# Patient Record
Sex: MALE | Race: WHITE | URBAN - METROPOLITAN AREA
[De-identification: names, ages, dates, MRNs, and addresses within clinical notes are randomized per-mention and may not be internally consistent; named-entity substitution may affect disease eponyms.]

---

## 2017-01-06 RX ORDER — PRAVASTATIN SODIUM 20 MG
TABLET ORAL
Qty: 30 TABLET | Refills: 5 | Status: SHIPPED | OUTPATIENT
Start: 2017-01-06 | End: 2017-07-11 | Stop reason: SDUPTHER

## 2017-06-13 ENCOUNTER — OFFICE VISIT (OUTPATIENT)
Dept: CARDIOLOGY CLINIC | Age: 82
End: 2017-06-13

## 2017-06-13 VITALS
SYSTOLIC BLOOD PRESSURE: 122 MMHG | DIASTOLIC BLOOD PRESSURE: 62 MMHG | HEIGHT: 69 IN | WEIGHT: 155 LBS | HEART RATE: 64 BPM | OXYGEN SATURATION: 98 % | BODY MASS INDEX: 22.96 KG/M2

## 2017-06-13 DIAGNOSIS — Z00.00 GENERAL MEDICAL EXAM: ICD-10-CM

## 2017-06-13 DIAGNOSIS — I65.23 BILATERAL CAROTID ARTERY STENOSIS: ICD-10-CM

## 2017-06-13 DIAGNOSIS — E78.2 MIXED HYPERLIPIDEMIA: ICD-10-CM

## 2017-06-13 DIAGNOSIS — I25.10 CORONARY ARTERY DISEASE INVOLVING NATIVE HEART WITHOUT ANGINA PECTORIS, UNSPECIFIED VESSEL OR LESION TYPE: Primary | ICD-10-CM

## 2017-06-13 DIAGNOSIS — I10 ESSENTIAL HYPERTENSION: ICD-10-CM

## 2017-06-13 PROCEDURE — 99214 OFFICE O/P EST MOD 30 MIN: CPT | Performed by: NURSE PRACTITIONER

## 2017-06-15 ENCOUNTER — TELEPHONE (OUTPATIENT)
Dept: CARDIOLOGY CLINIC | Age: 82
End: 2017-06-15

## 2017-06-15 ENCOUNTER — HOSPITAL ENCOUNTER (OUTPATIENT)
Dept: VASCULAR LAB | Age: 82
Discharge: HOME | End: 2017-06-15
Attending: NURSE PRACTITIONER | Admitting: NURSE PRACTITIONER

## 2017-06-15 DIAGNOSIS — I65.23 OCCLUSION AND STENOSIS OF BILATERAL CAROTID ARTERIES: ICD-10-CM

## 2017-06-15 DIAGNOSIS — I65.23 BILATERAL CAROTID ARTERY STENOSIS: ICD-10-CM

## 2017-07-11 RX ORDER — PRAVASTATIN SODIUM 20 MG
TABLET ORAL
Qty: 30 TABLET | Refills: 4 | Status: SHIPPED | OUTPATIENT
Start: 2017-07-11 | End: 2017-08-04 | Stop reason: SDUPTHER

## 2017-08-04 RX ORDER — PRAVASTATIN SODIUM 20 MG
TABLET ORAL
Qty: 90 TABLET | Refills: 1 | Status: SHIPPED | OUTPATIENT
Start: 2017-08-04 | End: 2018-06-03

## 2018-01-02 ENCOUNTER — OFFICE VISIT (OUTPATIENT)
Dept: CARDIOLOGY CLINIC | Age: 83
End: 2018-01-02

## 2018-01-02 ENCOUNTER — HOSPITAL ENCOUNTER (OUTPATIENT)
Dept: OTHER | Age: 83
Discharge: HOME | End: 2018-01-02
Attending: NURSE PRACTITIONER | Admitting: NURSE PRACTITIONER

## 2018-01-02 VITALS
HEART RATE: 52 BPM | BODY MASS INDEX: 23.4 KG/M2 | SYSTOLIC BLOOD PRESSURE: 120 MMHG | DIASTOLIC BLOOD PRESSURE: 60 MMHG | HEIGHT: 69 IN | WEIGHT: 158 LBS

## 2018-01-02 DIAGNOSIS — E78.2 MIXED HYPERLIPIDEMIA: Primary | ICD-10-CM

## 2018-01-02 DIAGNOSIS — I25.10 CORONARY ARTERY DISEASE INVOLVING NATIVE HEART WITHOUT ANGINA PECTORIS, UNSPECIFIED VESSEL OR LESION TYPE: ICD-10-CM

## 2018-01-02 DIAGNOSIS — I10 ESSENTIAL HYPERTENSION: ICD-10-CM

## 2018-01-02 DIAGNOSIS — I65.23 BILATERAL CAROTID ARTERY STENOSIS: ICD-10-CM

## 2018-01-02 DIAGNOSIS — E78.2 MIXED HYPERLIPIDEMIA: ICD-10-CM

## 2018-01-02 LAB
A/G RATIO: 1.3 (ref 1.1–2.2)
ALBUMIN SERPL-MCNC: 4.2 G/DL (ref 3.4–5)
ALP BLD-CCNC: 73 U/L (ref 40–129)
ALT SERPL-CCNC: 16 U/L (ref 10–40)
ANION GAP SERPL CALCULATED.3IONS-SCNC: 9 MMOL/L (ref 3–16)
AST SERPL-CCNC: 22 U/L (ref 15–37)
BILIRUB SERPL-MCNC: 0.6 MG/DL (ref 0–1)
BUN BLDV-MCNC: 17 MG/DL (ref 7–20)
CALCIUM SERPL-MCNC: 9.5 MG/DL (ref 8.3–10.6)
CHLORIDE BLD-SCNC: 100 MMOL/L (ref 99–110)
CHOLESTEROL, TOTAL: 126 MG/DL (ref 0–199)
CO2: 28 MMOL/L (ref 21–32)
CREAT SERPL-MCNC: 0.8 MG/DL (ref 0.8–1.3)
GFR AFRICAN AMERICAN: >60
GFR NON-AFRICAN AMERICAN: >60
GLOBULIN: 3.3 G/DL
GLUCOSE BLD-MCNC: 103 MG/DL (ref 70–99)
HDLC SERPL-MCNC: 36 MG/DL (ref 40–60)
LDL CHOLESTEROL CALCULATED: 69 MG/DL
POTASSIUM SERPL-SCNC: 4.8 MMOL/L (ref 3.5–5.1)
SODIUM BLD-SCNC: 137 MMOL/L (ref 136–145)
TOTAL PROTEIN: 7.5 G/DL (ref 6.4–8.2)
TRIGL SERPL-MCNC: 106 MG/DL (ref 0–150)
VLDLC SERPL CALC-MCNC: 21 MG/DL

## 2018-01-02 PROCEDURE — 99214 OFFICE O/P EST MOD 30 MIN: CPT | Performed by: NURSE PRACTITIONER

## 2018-01-02 NOTE — LETTER
[address] [facility]  [address]  [address] [address]  Phone: [phone]  Fax: [phone]    Alex Tyler NP        January 2, 2018     Amanda Noland CHAPIN  [address] [id number]    Patient: Ely Osorio  MR Number: [id number]  YOB: 1935  Date of Visit: 1/2/2018    Dear Dr. Steiner Given:      [facility]     Outpatient Follow Up Note    Ely Osorio is [de-identified] y.o. male who presents today with a history of CAD s/p CABG June '12, HTN, hyperlipidemia and tanvi carotid stenosis at 16-49% bilaterally by US Jun '17. CHIEF COMPLAINT / HPI:  Follow Up secondary to CAD    Subjective:   He denies significant chest pain. There is no SOB/MALIK. The patient denies orthopnea/PND. The patient does not have swelling. The patients weight is fairly stable at 160lbs. The patient is not experiencing palpitations. Once in a while he'll feel slightly dizzy. He denies swelling. These symptoms are stable since the last OV. With regard to medication therapy the patient has been compliant with prescribed regimen. They have tolerated therapy to date. Current Outpatient Prescriptions   Medication Sig Dispense Refill    pravastatin (PRAVACHOL) 20 MG tablet TAKE ONE TABLET BY MOUTH DAILY 90 tablet 1    metoprolol tartrate (LOPRESSOR) 25 MG tablet TAKE ONE-HALF TABLET BY MOUTH TWICE A DAY (Patient taking differently: once a day) 30 tablet 5    aspirin 81 MG EC tablet Take 1 tablet by mouth daily. 30 tablet 6    Vitamin D (CHOLECALCIFEROL) 1000 UNITS CAPS capsule Take 1,000 Units by mouth daily. REVIEW OF SYSTEMS:    RESPIRATORY: No new SOB, PND, orthopnea; + np cough. NEUROLOGICAL: + dizziness April '16; deniesTIA-like symptoms.     Objective:   PHYSICAL EXAM:    Vitals:    01/02/18 1036 01/02/18 1056   BP: 132/60 120/60   Site: Left Arm Left Arm   Position: Sitting    Cuff Size: Medium Adult Medium Adult   Pulse: 52

## 2018-01-02 NOTE — PROGRESS NOTES
[address]     Outpatient Follow Up Note    Josh Casitllo is [de-identified] y.o. male who presents today with a history of CAD s/p CABG June '12, HTN, hyperlipidemia and tanvi carotid stenosis at 16-49% bilaterally by US Jun '17. CHIEF COMPLAINT / HPI:  Follow Up secondary to CAD    Subjective:   He denies significant chest pain. There is no SOB/MALIK. The patient denies orthopnea/PND. The patient does not have swelling. The patients weight is fairly stable at 160lbs. The patient is not experiencing palpitations. Once in a while he'll feel slightly dizzy. He denies swelling. These symptoms are stable since the last OV. With regard to medication therapy the patient has been compliant with prescribed regimen. They have tolerated therapy to date. Past Medical History:   Diagnosis Date    Arthritis     CAD (coronary artery disease)     GERD (gastroesophageal reflux disease)     Hyperlipidemia     MI (myocardial infarction)     Myocardial infarct     40 years ago     Social History:    History   Smoking Status    Never Smoker   Smokeless Tobacco    Never Used     Current Medications:  Current Outpatient Prescriptions   Medication Sig Dispense Refill    pravastatin (PRAVACHOL) 20 MG tablet TAKE ONE TABLET BY MOUTH DAILY 90 tablet 1    metoprolol tartrate (LOPRESSOR) 25 MG tablet TAKE ONE-HALF TABLET BY MOUTH TWICE A DAY (Patient taking differently: once a day) 30 tablet 5    aspirin 81 MG EC tablet Take 1 tablet by mouth daily. 30 tablet 6    Vitamin D (CHOLECALCIFEROL) 1000 UNITS CAPS capsule Take 1,000 Units by mouth daily. No current facility-administered medications for this visit. REVIEW OF SYSTEMS:    CONSTITUTIONAL: No major weight gain or loss, fatigue, weakness, night sweats or fever. HEENT: No new vision difficulties or ringing in the ears. RESPIRATORY: No new SOB, PND, orthopnea; + np cough.    CARDIOVASCULAR: See HPI  GI: No nausea, vomiting, diarrhea, constipation, abdominal

## 2018-01-03 ENCOUNTER — TELEPHONE (OUTPATIENT)
Dept: CARDIOLOGY CLINIC | Age: 83
End: 2018-01-03

## 2018-01-03 NOTE — TELEPHONE ENCOUNTER
Spoke with patient and relayed information:  Notes Recorded by Jeffrey Coronado NP on 1/2/2018 at 4:35 PM EST  Looks good

## 2018-06-03 RX ORDER — PRAVASTATIN SODIUM 20 MG
TABLET ORAL
Qty: 30 TABLET | Refills: 5 | Status: SHIPPED | OUTPATIENT
Start: 2018-06-03 | End: 2018-10-12 | Stop reason: SDUPTHER

## 2018-07-11 ENCOUNTER — OFFICE VISIT (OUTPATIENT)
Dept: CARDIOLOGY CLINIC | Age: 83
End: 2018-07-11

## 2018-07-11 VITALS
WEIGHT: 171 LBS | BODY MASS INDEX: 25.33 KG/M2 | DIASTOLIC BLOOD PRESSURE: 62 MMHG | HEIGHT: 69 IN | SYSTOLIC BLOOD PRESSURE: 130 MMHG | HEART RATE: 55 BPM

## 2018-07-11 DIAGNOSIS — I25.10 CORONARY ARTERY DISEASE INVOLVING NATIVE HEART WITHOUT ANGINA PECTORIS, UNSPECIFIED VESSEL OR LESION TYPE: Primary | ICD-10-CM

## 2018-07-11 DIAGNOSIS — I65.23 BILATERAL CAROTID ARTERY STENOSIS: ICD-10-CM

## 2018-07-11 DIAGNOSIS — E78.2 MIXED HYPERLIPIDEMIA: ICD-10-CM

## 2018-07-11 DIAGNOSIS — I10 ESSENTIAL HYPERTENSION: ICD-10-CM

## 2018-07-11 PROCEDURE — 99214 OFFICE O/P EST MOD 30 MIN: CPT | Performed by: NURSE PRACTITIONER

## 2018-07-11 PROCEDURE — 93000 ELECTROCARDIOGRAM COMPLETE: CPT | Performed by: NURSE PRACTITIONER

## 2018-07-11 NOTE — PROGRESS NOTES
106 01/02/2018    TRIG 83 12/12/2016    TRIG 93 12/09/2015     Lab Results   Component Value Date    HDL 36 (L) 01/02/2018    HDL 46 12/12/2016    HDL 41 12/09/2015     Lab Results   Component Value Date    LDLCALC 69 01/02/2018    LDLCALC 51 12/12/2016    LDLCALC 60 12/09/2015     Lab Results   Component Value Date    LABVLDL 21 01/02/2018    LABVLDL 17 12/12/2016    LABVLDL 19 12/09/2015     Radiology Review:  Pertinent images / reports were reviewed as a part of this visit and reveals the following:    Carotid US: Jun '17:  Summary        -The right internal carotid artery appears to have a 16-49% diameter    reducing stenosis based on velocity criteria.    -The left internal carotid artery appears to have a 16-49% diameter reducing    stenosis based on velocity criteria. Last Stress Test: 5/5/2016  Normal exam  NSR throughout    Last Echo: 4/25/2016  -Normal left ventricle size, wall thickness and systolic function with an estimated ejection fraction of 60%.  -There is abnormal septal motion.   -There is reversal of E/A inflow velocities across the mitral valve   suggesting impaired left ventricular relaxation.   -There is trivial tricuspid regurgitation with RVSP estimated at 34 mmHg. Assessment:     1. Coronary artery disease involving native heart without angina pectoris, unspecified vessel or lesion type   ~stable : denies angina/CP  ~s/p CABG June '12  ~ASA / statin  ~exercises routinely     2. Essential hypertension   ~controlled    3. Carotid Stenosis  ~stable: tanvi ICA 16-49% by US in Jun '17 and comparable to US from '12  ~ ASA / Statin  ~denies stroke-like symptoms     3. Mixed hyperlipidemia   ~HDL near goal otherwise controlled on pravastatin          I had the opportunity to review the clinical symptoms and presentation of Magy Warner. Plan:     1. EKG: sinus rhythm, RBBB (comparable to EKG '16)  2.  Follow-up in 9 months   ~plan to repeat carotid US '23    Overall the patient is stable from CV standpoint    I have addresed the patient's cardiac risk factors and adjusted pharmacologic treatment as needed. In addition, I have reinforced the need for patient directed risk factor modification. Further evaluation will be based upon the patient's clinical course and testing results. All questions and concerns were addressed to the patient. Alternatives to my treatment were discussed. The patient is not currently smoking. The risks related to smoking were reviewed with the patient. Recommend maintaining a smoke-free lifestyle. Patient is on a beta-blocker  Patient is not on an ace-i/ARB: CHF negative. Stopped in March '13 d/t low BP  Patient is on a statin     Antiplatelet therapy has been recommended / prescribed for this patient. Education conducted on adverse reactions including bleeding was discussed. The patient verbalizes understanding not to stop medications without discussing with us. Discussed exercise: 30-60 minutes 7 days/week; 3 x / week : swim 4 lengths of pool, treadmill , bicycle, weight : one hour work out  Discussed Low saturated fat diet. Thank you for allowing to us to participate in the care of Yadira Weiner.     Ann Ramires CNP    Documentation of today's visit sent to PCP

## 2018-07-11 NOTE — LETTER
CONSTITUTIONAL: Cooperative, no apparent distress, and appears well nourished / developed  NEUROLOGIC:  Awake and orientated to person, place and time. PSYCH: Calm affect. SKIN: Warm and dry. HEENT: Sclera non-icteric, normocephalic, neck supple, no elevation of JVP, normal carotid pulses with no bruits and thyroid normal size. LUNGS:  No increased work of breathing and clear to auscultation, no crackles or wheezing  CARDIOVASCULAR:  Regular rate 52 and rhythm with no murmurs, gallops, rubs, or abnormal heart sounds, normal PMI. The apical impulses not displaced  JVP less than 8 cm H2O  Heart tones are crisp and normal  Cervical veins are not engorged  The carotid upstroke is normal in amplitude and contour without delay or bruit  JVP is not elevated  ABDOMEN:  Normal bowel sounds, non-distended and non-tender to palpation  EXT: No edema, no calf tenderness. Pulses are present bilaterally. DATA:    Lab Results   Component Value Date    ALT 16 01/02/2018    AST 22 01/02/2018    ALKPHOS 73 01/02/2018    BILITOT 0.6 01/02/2018     Lab Results   Component Value Date    CREATININE 0.8 01/02/2018    BUN 17 01/02/2018     01/02/2018    K 4.8 01/02/2018     01/02/2018    CO2 28 01/02/2018     Lab Results   Component Value Date    TRIG 106 01/02/2018     Lab Results   Component Value Date    HDL 36 (L) 01/02/2018     Lab Results   Component Value Date    LDLCALC 69 01/02/2018     Lab Results   Component Value Date    LABVLDL 21 01/02/2018       Assessment:     1. Coronary artery disease involving native heart without angina pectoris, unspecified vessel or lesion type   ~stable : denies angina/CP  ~s/p CABG June '12  ~ASA / statin  ~exercises routinely     2. Essential hypertension   ~controlled    3. Carotid Stenosis  ~stable: tanvi ICA 16-49% by US in Jun '17 and comparable to US from '12  ~ ASA / Statin  ~denies stroke-like symptoms     3.  Mixed hyperlipidemia ~HDL near goal otherwise controlled on pravastatin          I had the opportunity to review the clinical symptoms and presentation of Nacho Simmons. Plan:     1. EKG: sinus rhythm, RBBB (comparable to EKG '16)  2. Follow-up in 9 months   ~plan to repeat carotid US '19    Overall the patient is stable from CV standpoint    Thank you for allowing to us to participate in the care of Nacho Simmons. If you have questions, please do not hesitate to call me. I look forward to following Ike Garcia along with you.     Sincerely,      Nayana Chance, FILIBERTO - CNP

## 2018-10-15 RX ORDER — PRAVASTATIN SODIUM 20 MG
TABLET ORAL
Qty: 90 TABLET | Refills: 2 | Status: SHIPPED | OUTPATIENT
Start: 2018-10-15 | End: 2019-08-22 | Stop reason: SDUPTHER

## 2019-01-10 ENCOUNTER — OFFICE VISIT (OUTPATIENT)
Dept: ORTHOPEDIC SURGERY | Age: 84
End: 2019-01-10
Payer: MEDICARE

## 2019-01-10 VITALS
BODY MASS INDEX: 25.34 KG/M2 | HEIGHT: 69 IN | DIASTOLIC BLOOD PRESSURE: 78 MMHG | HEART RATE: 72 BPM | SYSTOLIC BLOOD PRESSURE: 132 MMHG | WEIGHT: 171.08 LBS

## 2019-01-10 DIAGNOSIS — M79.671 FOOT PAIN, RIGHT: Primary | ICD-10-CM

## 2019-01-10 DIAGNOSIS — M72.2 PLANTAR FASCIITIS: ICD-10-CM

## 2019-01-10 DIAGNOSIS — M79.672 FOOT PAIN, LEFT: ICD-10-CM

## 2019-01-10 PROCEDURE — 99203 OFFICE O/P NEW LOW 30 MIN: CPT | Performed by: PODIATRIST

## 2019-04-03 ENCOUNTER — OFFICE VISIT (OUTPATIENT)
Dept: CARDIOLOGY CLINIC | Age: 84
End: 2019-04-03
Payer: MEDICARE

## 2019-04-03 VITALS
HEIGHT: 69 IN | HEART RATE: 50 BPM | DIASTOLIC BLOOD PRESSURE: 68 MMHG | BODY MASS INDEX: 25.77 KG/M2 | SYSTOLIC BLOOD PRESSURE: 128 MMHG | OXYGEN SATURATION: 95 % | WEIGHT: 174 LBS

## 2019-04-03 DIAGNOSIS — E78.2 MIXED HYPERLIPIDEMIA: ICD-10-CM

## 2019-04-03 DIAGNOSIS — I10 ESSENTIAL HYPERTENSION: ICD-10-CM

## 2019-04-03 DIAGNOSIS — I25.10 CORONARY ARTERY DISEASE INVOLVING NATIVE HEART WITHOUT ANGINA PECTORIS, UNSPECIFIED VESSEL OR LESION TYPE: Primary | ICD-10-CM

## 2019-04-03 DIAGNOSIS — I65.23 BILATERAL CAROTID ARTERY STENOSIS: ICD-10-CM

## 2019-04-03 PROCEDURE — 99214 OFFICE O/P EST MOD 30 MIN: CPT | Performed by: NURSE PRACTITIONER

## 2019-04-03 NOTE — LETTER
CONSTITUTIONAL: Cooperative, no apparent distress, and appears well nourished / developed  NEUROLOGIC:  Awake and orientated to person, place and time. PSYCH: Calm affect. SKIN: Warm and dry. HEENT: Sclera non-icteric, normocephalic, neck supple, no elevation of JVP, normal carotid pulses with no bruits and thyroid normal size. LUNGS:  No increased work of breathing and clear to auscultation, no crackles or wheezing  CARDIOVASCULAR:  Regular rate 52 and rhythm with no murmurs, gallops, rubs, or abnormal heart sounds, normal PMI. The apical impulses not displaced  JVP less than 8 cm H2O  Heart tones are crisp and normal  Cervical veins are not engorged  The carotid upstroke is normal in amplitude and contour without delay or bruit  JVP is not elevated  ABDOMEN:  Normal bowel sounds, non-distended and non-tender to palpation  EXT: mild LE edema Rt > Lt, no calf tenderness. Pulses are present bilaterally. DATA:    Lab Results   Component Value Date    ALT 16 01/02/2018    AST 22 01/02/2018    ALKPHOS 73 01/02/2018    BILITOT 0.6 01/02/2018     Lab Results   Component Value Date    CREATININE 0.8 01/02/2018    BUN 17 01/02/2018     01/02/2018    K 4.8 01/02/2018     01/02/2018    CO2 28 01/02/2018     Lab Results   Component Value Date    TRIG 106 01/02/2018     Lab Results   Component Value Date    HDL 36 (L) 01/02/2018     Lab Results   Component Value Date    LDLCALC 69 01/02/2018       Assessment:     1. Coronary artery disease involving native heart without angina pectoris, unspecified vessel or lesion type   ~stable : offers no c/o angina  ~s/p CABG June '12  ~ASA / statin  ~void in exercise routine while helping wife recover from surgery    2. Essential hypertension   ~controlled on current regimen    3. Carotid Stenosis  ~stable: tanvi ICA 16-49% by US in Jun '17 and comparable to US from '12  ~ ASA / Statin  ~denies stroke-like symptoms     4.  Mixed hyperlipidemia ~HDL near goal on last profile March '18  ~remains on pravachol      I had the opportunity to review the clinical symptoms and presentation of Dolores Blackmon. Plan:     1. Carotid US as routine ~ late June   fasting lipid profile / CMP as routine  2. Follow-up in 6 months    Overall the patient is stable from CV standpoint    Thank you for allowing to us to participate in the care of Dolores Blackmon. If you have questions, please do not hesitate to call me. I look forward to following Adarsh Murrell along with you.     Sincerely,      Jamaal Miranda, FILIBERTO - CNP

## 2019-04-03 NOTE — PATIENT INSTRUCTIONS
Carotid ultrasound in late June as routine : check the circulation to your brain    Fasting cholesterol levels in the near future as routine    appt with Dr. Srinivasa Clemons in 6 months

## 2019-04-03 NOTE — COMMUNICATION BODY
[address]     Outpatient Follow Up Note    Lupe Rolbero is 82 y.o. male who presents today with a history of CAD s/p CABG June '12, HTN, hyperlipidemia and tanvi carotid stenosis at 16-49% bilaterally by US Jun '17. CHIEF COMPLAINT / HPI:  Follow Up secondary to CAD    Subjective:   He denies significant chest pain. There is no SOB/MALIK. The patient denies orthopnea/PND. The patient does not have swelling. The patients weight is up 3# over the past 9 months which he still blames on his wife's cooking. The patient is not experiencing palpitations / dizzy. He's a little light headed and his balance is off. He ruled out for Parkinson's. He thinks its from his bad knees. These symptoms are stable since the last OV. His home BP runs ~ 128/   With regard to medication therapy the patient has been compliant with prescribed regimen. They have tolerated therapy to date. Current Outpatient Medications   Medication Sig Dispense Refill    pravastatin (PRAVACHOL) 20 MG tablet TAKE ONE TABLET BY MOUTH DAILY 90 tablet 2    metoprolol tartrate (LOPRESSOR) 25 MG tablet TAKE 1/2 TABLET BY MOUTH TWO TIMES A DAY (Patient taking differently: TAKE 1 TABLET BY MOUTH QD) 90 tablet 3    aspirin 81 MG EC tablet Take 1 tablet by mouth daily. 30 tablet 6       Objective:   PHYSICAL EXAM:    Vitals:    04/03/19 1129 04/03/19 1150   BP: 136/60 128/68   Pulse: 50    SpO2: 95%    Weight: 174 lb (78.9 kg)    Height: 5' 9\" (1.753 m)          VITALS:  /60   Pulse 50   Ht 5' 9\" (1.753 m)   Wt 174 lb (78.9 kg)   SpO2 95%   BMI 25.70 kg/m²    CONSTITUTIONAL: Cooperative, no apparent distress, and appears well nourished / developed  NEUROLOGIC:  Awake and orientated to person, place and time. PSYCH: Calm affect. SKIN: Warm and dry. HEENT: Sclera non-icteric, normocephalic, neck supple, no elevation of JVP, normal carotid pulses with no bruits and thyroid normal size.   LUNGS:  No increased work of breathing and clear to auscultation, no crackles or wheezing  CARDIOVASCULAR:  Regular rate 52 and rhythm with no murmurs, gallops, rubs, or abnormal heart sounds, normal PMI. The apical impulses not displaced  JVP less than 8 cm H2O  Heart tones are crisp and normal  Cervical veins are not engorged  The carotid upstroke is normal in amplitude and contour without delay or bruit  JVP is not elevated  ABDOMEN:  Normal bowel sounds, non-distended and non-tender to palpation  EXT: mild LE edema Rt > Lt, no calf tenderness. Pulses are present bilaterally. DATA:    Lab Results   Component Value Date    ALT 16 01/02/2018    AST 22 01/02/2018    ALKPHOS 73 01/02/2018    BILITOT 0.6 01/02/2018     Lab Results   Component Value Date    CREATININE 0.8 01/02/2018    BUN 17 01/02/2018     01/02/2018    K 4.8 01/02/2018     01/02/2018    CO2 28 01/02/2018     Lab Results   Component Value Date    TRIG 106 01/02/2018     Lab Results   Component Value Date    HDL 36 (L) 01/02/2018     Lab Results   Component Value Date    LDLCALC 69 01/02/2018       Assessment:     1. Coronary artery disease involving native heart without angina pectoris, unspecified vessel or lesion type   ~stable : offers no c/o angina  ~s/p CABG June '12  ~ASA / statin  ~void in exercise routine while helping wife recover from surgery    2. Essential hypertension   ~controlled on current regimen    3. Carotid Stenosis  ~stable: tanvi ICA 16-49% by US in Jun '17 and comparable to US from '12  ~ ASA / Statin  ~denies stroke-like symptoms     4. Mixed hyperlipidemia   ~HDL near goal on last profile March '18  ~remains on pravachol      I had the opportunity to review the clinical symptoms and presentation of Yomaira Izquierdo. Plan:     1. Carotid US as routine ~ late June   fasting lipid profile / CMP as routine  2.  Follow-up in 6 months    Overall the patient is stable from CV standpoint    Thank you for allowing to us to participate in the care of Audra Gómezder Nia Kim.     Harjeet Alvarenga, APRN-CNP

## 2019-04-03 NOTE — PROGRESS NOTES
[facility]     Outpatient Follow Up Note    Rudi Oquendo is 82 y.o. male who presents today with a history of CAD s/p CABG June '12, HTN, hyperlipidemia and tanvi carotid stenosis at 16-49% bilaterally by US Jun '17. CHIEF COMPLAINT / HPI:  Follow Up secondary to CAD    Subjective:   He denies significant chest pain. There is no SOB/MALIK. The patient denies orthopnea/PND. The patient does not have swelling. The patients weight is up 3# over the past 9 months which he still blames on his wife's cooking. The patient is not experiencing palpitations / dizzy. He's a little light headed and his balance is off. He ruled out for Parkinson's. He thinks its from his bad knees. These symptoms are stable since the last OV. His home BP runs ~ 128/   With regard to medication therapy the patient has been compliant with prescribed regimen. They have tolerated therapy to date. Past Medical History:   Diagnosis Date    Arthritis     CAD (coronary artery disease)     GERD (gastroesophageal reflux disease)     Hyperlipidemia     [facility]) [facility] [address]     Myocardial infarct ([facility] [address]     40 years ago     Social History:    Social History     Tobacco Use   Smoking Status Never Smoker   Smokeless Tobacco Never Used     Current Medications:  Current Outpatient Medications   Medication Sig Dispense Refill    pravastatin (PRAVACHOL) 20 MG tablet TAKE ONE TABLET BY MOUTH DAILY 90 tablet 2    metoprolol tartrate (LOPRESSOR) 25 MG tablet TAKE 1/2 TABLET BY MOUTH TWO TIMES A DAY (Patient taking differently: TAKE 1 TABLET BY MOUTH QD) 90 tablet 3    aspirin 81 MG EC tablet Take 1 tablet by mouth daily. 30 tablet 6     No current facility-administered medications for this visit. REVIEW OF SYSTEMS:    CONSTITUTIONAL: No major weight gain or loss, fatigue, weakness, night sweats or fever. HEENT: No new vision difficulties or ringing in the ears. RESPIRATORY: No new SOB, PND, orthopnea; - cough. CARDIOVASCULAR: See HPI  GI: No nausea, vomiting, diarrhea, constipation, abdominal pain or changes in bowel habits. : No urinary frequency, urgency, incontinence hematuria or dysuria. SKIN: No cyanosis or skin lesions. MUSCULOSKELETAL: No new muscle or joint pain. NEUROLOGICAL: - dizziness ; deniesTIA-like symptoms. positive for neuropathy. Feet numb  PSYCHIATRIC: No anxiety, pain, insomnia or depression    Objective:   PHYSICAL EXAM:    Vitals:    04/03/19 1129 04/03/19 1150   BP: 136/60 128/68   Pulse: 50    SpO2: 95%    Weight: 174 lb (78.9 kg)    Height: 5' 9\" (1.753 m)          VITALS:  /60   Pulse 50   Ht 5' 9\" (1.753 m)   Wt 174 lb (78.9 kg)   SpO2 95%   BMI 25.70 kg/m²   CONSTITUTIONAL: Cooperative, no apparent distress, and appears well nourished / developed  NEUROLOGIC:  Awake and orientated to person, place and time. PSYCH: Calm affect. SKIN: Warm and dry. HEENT: Sclera non-icteric, normocephalic, neck supple, no elevation of JVP, normal carotid pulses with no bruits and thyroid normal size. LUNGS:  No increased work of breathing and clear to auscultation, no crackles or wheezing  CARDIOVASCULAR:  Regular rate 52 and rhythm with no murmurs, gallops, rubs, or abnormal heart sounds, normal PMI. The apical impulses not displaced  JVP less than 8 cm H2O  Heart tones are crisp and normal  Cervical veins are not engorged  The carotid upstroke is normal in amplitude and contour without delay or bruit  JVP is not elevated  ABDOMEN:  Normal bowel sounds, non-distended and non-tender to palpation  EXT: mild LE edema Rt > Lt, no calf tenderness. Pulses are present bilaterally.     DATA:    Lab Results   Component Value Date    ALT 16 01/02/2018    AST 22 01/02/2018    ALKPHOS 73 01/02/2018    BILITOT 0.6 01/02/2018     Lab Results   Component Value Date    CREATININE 0.8 01/02/2018    BUN 17 01/02/2018     01/02/2018    K 4.8 01/02/2018     01/02/2018    CO2 28 01/02/2018     Lab Results   Component Value Date    TRIG 106 01/02/2018    TRIG 83 12/12/2016    TRIG 93 12/09/2015     Lab Results   Component Value Date    HDL 36 (L) 01/02/2018    HDL 46 12/12/2016    HDL 41 12/09/2015     Lab Results   Component Value Date    LDLCALC 69 01/02/2018    LDLCALC 51 12/12/2016    LDLCALC 60 12/09/2015     Lab Results   Component Value Date    LABVLDL 21 01/02/2018    LABVLDL 17 12/12/2016    LABVLDL 19 12/09/2015     Radiology Review:  Pertinent images / reports were reviewed as a part of this visit and reveals the following:    Carotid US: Jun '17:  Summary        -The right internal carotid artery appears to have a 16-49% diameter    reducing stenosis based on velocity criteria.    -The left internal carotid artery appears to have a 16-49% diameter reducing    stenosis based on velocity criteria. Last Stress Test: 5/5/2016  Normal exam  NSR throughout    Last Echo: 4/25/2016  -Normal left ventricle size, wall thickness and systolic function with an estimated ejection fraction of 60%.  -There is abnormal septal motion.   -There is reversal of E/A inflow velocities across the mitral valve   suggesting impaired left ventricular relaxation.   -There is trivial tricuspid regurgitation with RVSP estimated at 34 mmHg. Assessment:     1. Coronary artery disease involving native heart without angina pectoris, unspecified vessel or lesion type   ~stable : offers no c/o angina  ~s/p CABG June '12  ~ASA / statin  ~void in exercise routine while helping wife recover from surgery    2. Essential hypertension   ~controlled on current regimen    3. Carotid Stenosis  ~stable: tanvi ICA 16-49% by US in Jun '17 and comparable to US from '12  ~ ASA / Statin  ~denies stroke-like symptoms     4. Mixed hyperlipidemia   ~HDL near goal on last profile March '18  ~remains on pravachol          I had the opportunity to review the clinical symptoms and presentation of Lupe Roblero. Plan:     1.   Carotid US as routine ~ late June   fasting lipid profile / CMP as routine  2. Follow-up in 6 months    Overall the patient is stable from CV standpoint    I have addresed the patient's cardiac risk factors and adjusted pharmacologic treatment as needed. In addition, I have reinforced the need for patient directed risk factor modification. Further evaluation will be based upon the patient's clinical course and testing results. All questions and concerns were addressed to the patient. Alternatives to my treatment were discussed. The patient is not currently smoking. The risks related to smoking were reviewed with the patient. Recommend maintaining a smoke-free lifestyle. Patient is on a beta-blocker  Patient is not on an ace-i/ARB: CHF negative. Stopped in March '13 d/t low BP  Patient is on a statin     Antiplatelet therapy has been recommended / prescribed for this patient. Education conducted on adverse reactions including bleeding was discussed. The patient verbalizes understanding not to stop medications without discussing with us. Discussed exercise: 30-60 minutes 7 days/week;  hasn't exercise the past 6 months of helping wife recover  Discussed Low saturated fat diet. Thank you for allowing to us to participate in the care of Lucie Kaye.     FILIBERTO Alba-CNP    Documentation of today's visit sent to PCP

## 2019-04-09 ENCOUNTER — HOSPITAL ENCOUNTER (OUTPATIENT)
Age: 84
Discharge: HOME | End: 2019-04-09
Payer: MEDICARE

## 2019-04-09 LAB
A/G RATIO: 1.3 (ref 1.1–2.2)
ALBUMIN SERPL-MCNC: 4.2 G/DL (ref 3.4–5)
ALP BLD-CCNC: 76 U/L (ref 40–129)
ALT SERPL-CCNC: 15 U/L (ref 10–40)
ANION GAP SERPL CALCULATED.3IONS-SCNC: 9 MMOL/L (ref 3–16)
AST SERPL-CCNC: 23 U/L (ref 15–37)
BILIRUB SERPL-MCNC: 0.8 MG/DL (ref 0–1)
BUN BLDV-MCNC: 20 MG/DL (ref 7–20)
CALCIUM SERPL-MCNC: 9.5 MG/DL (ref 8.3–10.6)
CHLORIDE BLD-SCNC: 100 MMOL/L (ref 99–110)
CHOLESTEROL, TOTAL: 134 MG/DL (ref 0–199)
CO2: 27 MMOL/L (ref 21–32)
CREAT SERPL-MCNC: 0.9 MG/DL (ref 0.8–1.3)
GFR AFRICAN AMERICAN: >60
GFR NON-AFRICAN AMERICAN: >60
GLOBULIN: 3.2 G/DL
GLUCOSE BLD-MCNC: 99 MG/DL (ref 70–99)
HDLC SERPL-MCNC: 38 MG/DL (ref 40–60)
LDL CHOLESTEROL CALCULATED: 80 MG/DL
POTASSIUM SERPL-SCNC: 4.4 MMOL/L (ref 3.5–5.1)
SODIUM BLD-SCNC: 136 MMOL/L (ref 136–145)
TOTAL PROTEIN: 7.4 G/DL (ref 6.4–8.2)
TRIGL SERPL-MCNC: 81 MG/DL (ref 0–150)
VLDLC SERPL CALC-MCNC: 16 MG/DL

## 2019-04-09 PROCEDURE — 36415 COLL VENOUS BLD VENIPUNCTURE: CPT

## 2019-04-09 PROCEDURE — 80053 COMPREHEN METABOLIC PANEL: CPT

## 2019-04-09 PROCEDURE — 80061 LIPID PANEL: CPT

## 2019-04-11 ENCOUNTER — TELEPHONE (OUTPATIENT)
Dept: CARDIOLOGY CLINIC | Age: 84
End: 2019-04-11

## 2019-04-11 NOTE — TELEPHONE ENCOUNTER
Result Notes for Lipid Panel     Notes recorded by FILIBERTO Alba CNP on 4/11/2019 at 4:38 PM EDT  HDL a little low otherwise controlled. Sent lab results to patient's my chart.

## 2019-04-14 ENCOUNTER — HOSPITAL ENCOUNTER (EMERGENCY)
Age: 84
Discharge: HOME | End: 2019-04-14
Attending: EMERGENCY MEDICINE
Payer: MEDICARE

## 2019-04-14 ENCOUNTER — APPOINTMENT (OUTPATIENT)
Dept: GENERAL RADIOLOGY | Age: 84
End: 2019-04-14
Payer: MEDICARE

## 2019-04-14 ENCOUNTER — APPOINTMENT (OUTPATIENT)
Dept: CT IMAGING | Age: 84
End: 2019-04-14
Payer: MEDICARE

## 2019-04-14 VITALS
HEIGHT: 69 IN | TEMPERATURE: 98.4 F | DIASTOLIC BLOOD PRESSURE: 59 MMHG | BODY MASS INDEX: 23.7 KG/M2 | WEIGHT: 160 LBS | OXYGEN SATURATION: 95 % | HEART RATE: 60 BPM | RESPIRATION RATE: 17 BRPM | SYSTOLIC BLOOD PRESSURE: 110 MMHG

## 2019-04-14 DIAGNOSIS — R55 NEAR SYNCOPE: Primary | ICD-10-CM

## 2019-04-14 LAB
A/G RATIO: 1.2 (ref 1.1–2.2)
ALBUMIN SERPL-MCNC: 4.1 G/DL (ref 3.4–5)
ALP BLD-CCNC: 78 U/L (ref 40–129)
ALT SERPL-CCNC: 18 U/L (ref 10–40)
ANION GAP SERPL CALCULATED.3IONS-SCNC: 10 MMOL/L (ref 3–16)
AST SERPL-CCNC: 23 U/L (ref 15–37)
BASOPHILS ABSOLUTE: 0 K/UL (ref 0–0.2)
BASOPHILS RELATIVE PERCENT: 0.3 %
BILIRUB SERPL-MCNC: 0.6 MG/DL (ref 0–1)
BUN BLDV-MCNC: 22 MG/DL (ref 7–20)
CALCIUM SERPL-MCNC: 9.4 MG/DL (ref 8.3–10.6)
CHLORIDE BLD-SCNC: 101 MMOL/L (ref 99–110)
CO2: 27 MMOL/L (ref 21–32)
CREAT SERPL-MCNC: 0.9 MG/DL (ref 0.8–1.3)
EOSINOPHILS ABSOLUTE: 0.1 K/UL (ref 0–0.6)
EOSINOPHILS RELATIVE PERCENT: 1.9 %
GFR AFRICAN AMERICAN: >60
GFR NON-AFRICAN AMERICAN: >60
GLOBULIN: 3.3 G/DL
GLUCOSE BLD-MCNC: 73 MG/DL (ref 70–99)
HCT VFR BLD CALC: 43.9 % (ref 40.5–52.5)
HEMOGLOBIN: 14.7 G/DL (ref 13.5–17.5)
LYMPHOCYTES ABSOLUTE: 1.8 K/UL (ref 1–5.1)
LYMPHOCYTES RELATIVE PERCENT: 35 %
MCH RBC QN AUTO: 30.5 PG (ref 26–34)
MCHC RBC AUTO-ENTMCNC: 33.5 G/DL (ref 31–36)
MCV RBC AUTO: 91.2 FL (ref 80–100)
MONOCYTES ABSOLUTE: 0.5 K/UL (ref 0–1.3)
MONOCYTES RELATIVE PERCENT: 10.2 %
NEUTROPHILS ABSOLUTE: 2.7 K/UL (ref 1.7–7.7)
NEUTROPHILS RELATIVE PERCENT: 52.6 %
PDW BLD-RTO: 13.7 % (ref 12.4–15.4)
PLATELET # BLD: 181 K/UL (ref 135–450)
PMV BLD AUTO: 8.7 FL (ref 5–10.5)
POTASSIUM REFLEX MAGNESIUM: 4.3 MMOL/L (ref 3.5–5.1)
RBC # BLD: 4.81 M/UL (ref 4.2–5.9)
SODIUM BLD-SCNC: 138 MMOL/L (ref 136–145)
TOTAL PROTEIN: 7.4 G/DL (ref 6.4–8.2)
TROPONIN: <0.01 NG/ML
WBC # BLD: 5.1 K/UL (ref 4–11)

## 2019-04-14 PROCEDURE — 93005 ELECTROCARDIOGRAM TRACING: CPT | Performed by: EMERGENCY MEDICINE

## 2019-04-14 PROCEDURE — 80053 COMPREHEN METABOLIC PANEL: CPT

## 2019-04-14 PROCEDURE — 84484 ASSAY OF TROPONIN QUANT: CPT

## 2019-04-14 PROCEDURE — 70450 CT HEAD/BRAIN W/O DYE: CPT

## 2019-04-14 PROCEDURE — 71045 X-RAY EXAM CHEST 1 VIEW: CPT

## 2019-04-14 PROCEDURE — 99284 EMERGENCY DEPT VISIT MOD MDM: CPT

## 2019-04-14 PROCEDURE — 85025 COMPLETE CBC W/AUTO DIFF WBC: CPT

## 2019-04-14 NOTE — ED PROVIDER NOTES
[address] [facility]  eMERGENCY dEPARTMENTeNCOUnter      Pt Name: Alicia Christianson  MRN: [id number]  [place] 1935  Date ofevaluation: 4/14/2019  Provider: Mkie Falcon MD    CHIEF COMPLAINT       Chief Complaint   Patient presents with    Loss of Consciousness     pt states fell down yesterday unsure if he passed out - in the evening- states dizziness ongoing for the past three years- has seen multiple doctors for this complaint; states always feels dizzy/lightheaded. HISTORY OF PRESENT ILLNESS   (Location/Symptom, Timing/Onset,Context/Setting, Quality, Duration, Modifying Factors, Severity)  Note limiting factors. Alicia Christianson is a 82 y.o. male who presents to the emergency department  After almost passing out yesterday. The patient states that he fell while standing last evening around 9:00 pm.   The patient states he doesn't think he simply passed out. He denies any head injury. He denies any chest pain or abdominal pain. The patient states this is happened before. The patient states she's had dizziness for the last 3 years. HPI    NursingNotes were reviewed. REVIEW OF SYSTEMS    (2-9 systems for level 4, 10 or more for level 5)     Review of Systems    Negative for GI  musculoskeletal neurological pulmonary and cardiac complaints and all others reviewed and negative  General Appearance:  Alert, cooperative, no distress, appears stated age. Head:  Normocephalic, without obvious abnormality, atraumatic. Eyes:  conjunctiva/corneas clear, EOM's intact. Sclera anicteric. ENT: Mucous membranes moist.   Neck: Supple, symmetrical, trachea midline, no adenopathy. No jugular venous distention. Lungs:   No Respiratory Distress. Chest Wall:     Heart:   regular rate rhythm with no murmurs rubs or gallops   Abdomen:    soft and benign. No pulsatile masses. Extremities:  Full range of motion.    Pulses:   Good throughout   Skin:  No rashes or lesions to exposed skin. Neurologic: Alert and oriented X 3. Motor grossly normal.  Speech clear. Except as noted above the remainder of the review of systems was reviewed and negative. PAST MEDICAL HISTORY     Past Medical History:   Diagnosis Date    Arthritis     CAD (coronary artery disease)     GERD (gastroesophageal reflux disease)     Hyperlipidemia     [facility]     Myocardial infarct ([facility]     40 years ago         SURGICALHISTORY       Past Surgical History:   Procedure Laterality Date    ABDOMEN SURGERY  2013    bowel blockage    ABDOMINAL EXPLORATION SURGERY  1/3/13    CARDIAC SURGERY      4v----- 7/26/12    HERNIA REPAIR  4/5/2016    ventral    PROSTATE SURGERY      VEIN BYPASS SURGERY      VENTRAL HERNIA REPAIR Right 4/5/16    lysis of adhesions         CURRENT MEDICATIONS       Previous Medications    ASPIRIN 81 MG EC TABLET    Take 1 tablet by mouth daily. METOPROLOL TARTRATE (LOPRESSOR) 25 MG TABLET    TAKE 1/2 TABLET BY MOUTH TWO TIMES A DAY    PRAVASTATIN (PRAVACHOL) 20 MG TABLET    TAKE ONE TABLET BY MOUTH DAILY       ALLERGIES     Patient has no known allergies. FAMILY HISTORY       Family History   Problem Relation Age of Onset    Diabetes Father     Heart Disease Father     Heart Attack Father     Heart Disease Mother     Heart Attack Mother     Anesth Problems Neg Hx     Malig Hyperten Neg Hx     Hypotension Neg Hx     Malig Hypertherm Neg Hx     Pseudochol.  Deficiency Neg Hx           SOCIAL HISTORY       Social History     Socioeconomic History    Marital status:      Spouse name: None    Number of children: None    Years of education: None    Highest education level: None   Occupational History    None   Social Needs    Financial resource strain: None    Food insecurity:     Worry: None     Inability: None    Transportation needs:     Medical: None     Non-medical: None   Tobacco Use    Smoking status: Never Smoker    Smokeless tobacco: Never Used   Substance and Sexual Activity    Alcohol use: No     Alcohol/week: 0.0 oz    Drug use: No    Sexual activity: Not Currently   Lifestyle    Physical activity:     Days per week: None     Minutes per session: None    Stress: None   Relationships    Social connections:     Talks on phone: None     Gets together: None     Attends Advent service: None     Active member of club or organization: None     Attends meetings of clubs or organizations: None     Relationship status: None    Intimate partner violence:     Fear of current or ex partner: None     Emotionally abused: None     Physically abused: None     Forced sexual activity: None   Other Topics Concern    None   Social History Narrative    None       SCREENINGS             PHYSICAL EXAM    (up to 7 for level 4, 8 or more for level 5)     ED Triage Vitals [04/14/19 1351]   BP Temp Temp Source Pulse Resp SpO2 Height Weight   133/62 98.4 °F (36.9 °C) Oral 71 11 95 % 5' 9\" (1.753 m) 160 lb (72.6 kg)       Physical Exam    DIAGNOSTIC RESULTS     EKG: All EKG's are interpreted by the Emergency Department Physician who either signs or Co-signsthis chart in the absence of a cardiologist.      Sinus rhythm at a rate of 60 beats a minute with no acute ST elevations or depressions or pathologic Q waves. Old right bundle-branch block. RADIOLOGY:   Non-plain filmimages such as CT, Ultrasound and MRI are read by the radiologist. Plain radiographic images are visualized and preliminarily interpreted by the emergency physician with the below findings:     see below    Interpretation per the Radiologist below, if available at the time ofthis note:    CT Head WO Contrast   Final Result   Chronic changes as detailed above. XR CHEST PORTABLE   Final Result   No acute abnormality detected.                ED BEDSIDE ULTRASOUND:   Performed by ED Physician - none    LABS:  Labs Reviewed   COMPREHENSIVE METABOLIC PANEL W/ REFLEX TO MG FOR LOW K - Abnormal; Notable for the following components:       Result Value    BUN 22 (*)     All other components within normal limits    Narrative:     Performed at:  [facility]  [address] [facility]  [place], [place] [employer]   Phone [phone]   CBC WITH AUTO DIFFERENTIAL    Narrative:     Performed at:  [facility]  [address] [facility]  [place], [place] [employer]   Phone [phone]   TROPONIN    Narrative:     Performed at:  [facility]  [address] [facility]  [place], [address] [employer]   Phone [phone]       All other labs were within normal range or not returned as of this dictation. EMERGENCY DEPARTMENT COURSE and DIFFERENTIAL DIAGNOSIS/MDM:   Vitals:    Vitals:    04/14/19 1351 04/14/19 1500   BP: 133/62 (!) 110/59   Pulse: 71 60   Resp: 11 17   Temp: 98.4 °F (36.9 °C)    TempSrc: Oral    SpO2: 95%    Weight: 160 lb (72.6 kg)    Height: 5' 9\" (1.753 m)          The patient has remained stable throughout his hospital course. The patient's workup was unremarkable including an EKG and troponin. A CT was also obtained that shows no acute findings. The patient states this has happened in the past and this is not new. Again, he denies actually complete passing out. This appeared be more of a near syncopal episode. I am referring the patient to our neurologist for further care and workup. He is to return for worsening of symptoms or if this happens again. MDM      REASSESSMENT              CONSULTS:  None    PROCEDURES:  Unless otherwise noted below, none     Procedures    FINAL IMPRESSION      1.  Near syncope          DISPOSITION/PLAN   DISPOSITION        PATIENT REFERREDTO:  Luh Lai MD  [facility] [address]  [address] [address]  [phone]    Call in 2 days      Ashley Jansen MD  [address] [place] [address]  [phone]    Call in 2 days  Make an appointment for the neurologist.      Erasto Sims:  New Prescriptions    No medications on file     Controlled Substances Monitoring:     No flowsheet data found.     (Please note that portions of this note were completed with a voice recognition program.  Efforts were made to edit the dictations but occasionally words are mis-transcribed.)    Marco Brito MD (electronically signed)  Attending Emergency Physician         Marco Brito MD  04/14/19 7818

## 2019-04-14 NOTE — ED NOTES
Patient discharged at this time in no acute distress after verbalizing understanding of discharge instructions and need for follow up with PCP .   Pt left ambulatory to discharge area after reviewing and receiving copy of ov AVS.   Patient education  Learner- Patient and family  Motivation and readiness to learn- Medium to High  Barriers to learning- None  Learning preference/provided instructions- Both written and verbal discharge instructions     Lamin Lockhart RN  04/14/19 4263

## 2019-04-14 NOTE — ED NOTES
Assume patient care at this time. Agree with previous assessment. Pt alert, TIM, NAD, VS as charted. PIV placed, labs sent. Current Plan of Care reviewed with patient. Pt denies any needs or questions at this time. Bed locked and in low position, with side rails up x 2.       Hannah Albarran, RN  04/14/19 5862

## 2019-04-15 LAB
EKG ATRIAL RATE: 60 BPM
EKG DIAGNOSIS: NORMAL
EKG P AXIS: 38 DEGREES
EKG P-R INTERVAL: 194 MS
EKG Q-T INTERVAL: 432 MS
EKG QRS DURATION: 114 MS
EKG QTC CALCULATION (BAZETT): 432 MS
EKG R AXIS: -46 DEGREES
EKG T AXIS: 41 DEGREES
EKG VENTRICULAR RATE: 60 BPM

## 2019-04-15 PROCEDURE — 93010 ELECTROCARDIOGRAM REPORT: CPT | Performed by: INTERNAL MEDICINE

## 2019-06-18 ENCOUNTER — HOSPITAL ENCOUNTER (OUTPATIENT)
Age: 84
Discharge: HOME | End: 2019-06-18
Payer: MEDICARE

## 2019-06-18 ENCOUNTER — OFFICE VISIT (OUTPATIENT)
Dept: NEUROLOGY | Age: 84
End: 2019-06-18
Payer: MEDICARE

## 2019-06-18 VITALS
DIASTOLIC BLOOD PRESSURE: 82 MMHG | WEIGHT: 174 LBS | SYSTOLIC BLOOD PRESSURE: 133 MMHG | HEIGHT: 69 IN | HEART RATE: 66 BPM | BODY MASS INDEX: 25.77 KG/M2

## 2019-06-18 DIAGNOSIS — R29.6 RECURRENT FALLS: ICD-10-CM

## 2019-06-18 DIAGNOSIS — G11.8 SPINOCEREBELLAR ATAXIA (HCC): ICD-10-CM

## 2019-06-18 DIAGNOSIS — M50.90 CERVICAL DISC DISEASE: ICD-10-CM

## 2019-06-18 DIAGNOSIS — G11.8 SPINOCEREBELLAR ATAXIA (HCC): Primary | ICD-10-CM

## 2019-06-18 PROCEDURE — 86790 VIRUS ANTIBODY NOS: CPT

## 2019-06-18 PROCEDURE — 82525 ASSAY OF COPPER: CPT

## 2019-06-18 PROCEDURE — 99205 OFFICE O/P NEW HI 60 MIN: CPT | Performed by: PSYCHIATRY & NEUROLOGY

## 2019-06-18 NOTE — PROGRESS NOTES
NEUROLOGY CONSULTATION     Chief Complaint   Patient presents with    Loss of Consciousness     Patient is here today to establish care. Patient complains of episodes of passing out and poor balance. HISTORY OF PRESENT ILLNESS :    Nile Sotelo is a 82 y.o. male who is referred by Dr. Nadia Gamboa   History was obtained from patient  Patient states that he has had poor balance for about 2 to 3 years. Onset was gradual and the symptoms are persistent. No clear aggravating or relieving factors. Patient states that he has had 2 episodes in which he fell while standing at the sink but there was no true loss of consciousness. He would fall to the floor and pick himself back up. He does not know why he falls. There is no actual dizziness or vertigo. There is no diplopia. Denies any focal weakness   Patient admits to tingling and numbness in both feet. There is no family history of similar illness.   Patient was seen at [facility] emergency room and a CT head was normal.    REVIEW OF SYSTEMS    Constitutional:  []   Chills   []  Fatigue   []  Fevers   []  Malaise   []  Weight loss     [x] Denies all of the above    Eyes:  []  Double vision   []  Blurry vision     [x] Denies all of the above    Ears, nose, mouth, throat, and face:   [] Hearing loss    []   Hoarseness      []  Snoring    []  Tinnitus       [x] Denies all of the above     Respiratory:   []  Cough    []  Shortness of breath         [x] Denies all of the above     Cardiovascular:   []  Chest pain    []  Exertional chest pressure/discomfort           [] Palpitations    []  Syncope     [x] Denies all of the above    Gastrointestinal:   []  Abdominal pain   []  Constipation    []  Diarrhea    []   Dysphagia                      [x] Denies all of the above    Genitourinary:      []  Frequency   []  Hematuria     []  Urinary incontinence           [x] Denies all of the above Hematologic/lymphatic:  []  Bleeding    []  Easy bruising   []  Anemia  [x] Denies all of the above     Musculoskeletal:   [] Back pain       []  Myalgias    []  Neck pain           [x] Denies all of the above    Neurological: As noted in HPI    Behavioral/Psych:   [] Anxiety    []  Depression     []  Mood swings     [x] Denies all of the above     Endocrine:   []  Temperature intolerance     [] Fatigue      [x] Denies all of the above     Allergic/Immunologic:   [] Hay fever    [x] Denies all of the above     Past Medical History:   Diagnosis Date    Arthritis     CAD (coronary artery disease)     GERD (gastroesophageal reflux disease)     Hyperlipidemia     [facility] [address] [facility]     [facility]     36 years ago     Family History   Problem Relation Age of Onset    Diabetes Father     Heart Disease Father     Heart Attack Father     Heart Disease Mother     Heart Attack Mother     Anesth Problems Neg Hx     Malig Hyperten Neg Hx     Hypotension Neg Hx     Malig Hypertherm Neg Hx     Pseudochol.  Deficiency Neg Hx      Social History     Socioeconomic History    Marital status:      Spouse name: None    Number of children: None    Years of education: None    Highest education level: None   Occupational History    None   Social Needs    Financial resource strain: None    Food insecurity:     Worry: None     Inability: None    Transportation needs:     Medical: None     Non-medical: None   Tobacco Use    Smoking status: Never Smoker    Smokeless tobacco: Never Used   Substance and Sexual Activity    Alcohol use: No     Alcohol/week: 0.0 oz    Drug use: No    Sexual activity: Not Currently   Lifestyle    Physical activity:     Days per week: None     Minutes per session: None    Stress: None   Relationships    Social connections:     Talks on phone: None     Gets together: None     Attends Anabaptist service: None     Active member of club or organization: None     Attends meetings of clubs or organizations: None     Relationship status: None    Intimate partner violence:     Fear of current or ex partner: None     Emotionally abused: None     Physically abused: None     Forced sexual activity: None   Other Topics Concern    None   Social History Narrative    None       PHYSICAL EXAMINATION:  /82   Pulse 66   Ht 5' 9\" (1.753 m)   Wt 174 lb (78.9 kg)   BMI 25.70 kg/m²   Appearance: Well appearing, well nourished and in no distress  Mental Status Exam: Patient is alert, oriented to person, place and time. Recent and remote memory is normal  Fund of Knowledge is normal  Attention/concentration is normal.   Speech : No dysarthria  Language : No aphasia  Funduscopic Exam: sharp disc margins  Cranial Nerves:   II: Visual fields:  Full to confrontation  III: Pupils:  equal, round, reactive to light  III,IV,VI: Patient has an esotropia on the left eye. No nystagmus  V: Facial sensation is intact to pin prick and light touch  VII: Facial strength and movements: intact and symmetric smile,cheek puffing and eyebrow elevation  VIII: Hearing:  Intact to finger rub bilaterally  IX: Palate  elevation is symmetric  XI: Shoulder shrug is intact  XII: Tongue movements are normal  Motor:  Muscle tone and bulk are normal.   Strength is symmetrical 5/5 in all four extremities. Sensory: Slightly decreased to light touch and  pin prick in bilateral distal lower extremities  Coordination:  Normal  Finger to Nose and Heel to Shin bilaterally    . Reflexes:  DTR +2 and symmetric bilaterally  Plantar response: Extensor response bilaterally  Gait: Gait is significantly abnormal with ataxia and wide-based gait Romberg: negative  Vascular: No carotid bruit bilaterally        DATA:  Lab results from outside hospital were reviewed. CBC metabolic profile TSH and U03 are normal.  MRI brain done at [facility] showed mild age-related changes.   MRI cervical spine done at [facility] showed multilevel neuroforaminal narrowing but there was no evidence of spinal cord compression. 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 However there is no family history of similar illness. Extensive medical records from outside hospital were reviewed. There is no clear evidence for syncope. There is no loss of consciousness. Patient loses his balance and falls down and picks himself back up. Patient has some evidence clinically of myelopathy. However MRI cervical spine did not show any spinal cord compression  I would like to consider and rule out metabolic abnormalities like copper deficiency and HTLV disease      RECOMMENDATIONS :  Discussed at length with patient  Explained my findings  Check serum copper and HTLV  Strongly recommended that he use a cane to prevent falls and help with his balance  I will see him back in 6 months. Thank you for this consultation. Please note a portion of this chart was generated using dragon dictation software. Although every effort was made to ensure the accuracy of this automated transcription, some errors in transcription may have occurred.

## 2019-06-18 NOTE — PATIENT INSTRUCTIONS
Please call with any questions or concerns:   [facility] Neurology  @ [phone]. LAB RESULTS:  Please obtain any labs or diagnostic tests as discussed today. You should call the office to check the results. Please allow  3 to 7 days for us to get these results. MEDICATION LIST:  Please bring an accurate list of your medications to every visit. APPOINTMENT CONFIRMATION:  We will call you the day before your scheduled appointment to confirm. If we are unable to reach you, you MUST call back by the end of the day to confirm the appointment or we may be forced to cancel.

## 2019-06-19 ENCOUNTER — HOSPITAL ENCOUNTER (OUTPATIENT)
Dept: VASCULAR LAB | Age: 84
Discharge: HOME | End: 2019-06-19
Payer: MEDICARE

## 2019-06-19 DIAGNOSIS — I65.23 BILATERAL CAROTID ARTERY STENOSIS: ICD-10-CM

## 2019-06-19 PROCEDURE — 93880 EXTRACRANIAL BILAT STUDY: CPT

## 2019-06-20 LAB — HTLV I/II AB: NEGATIVE

## 2019-06-21 LAB — COPPER: 114.9 UG/DL (ref 70–140)

## 2019-06-23 ENCOUNTER — HOSPITAL ENCOUNTER (EMERGENCY)
Age: 84
Discharge: HOME | End: 2019-06-23
Payer: MEDICARE

## 2019-06-23 ENCOUNTER — APPOINTMENT (OUTPATIENT)
Dept: GENERAL RADIOLOGY | Age: 84
End: 2019-06-23
Payer: MEDICARE

## 2019-06-23 VITALS
SYSTOLIC BLOOD PRESSURE: 129 MMHG | HEART RATE: 67 BPM | BODY MASS INDEX: 26.51 KG/M2 | OXYGEN SATURATION: 94 % | DIASTOLIC BLOOD PRESSURE: 68 MMHG | TEMPERATURE: 98.1 F | WEIGHT: 179 LBS | HEIGHT: 69 IN | RESPIRATION RATE: 18 BRPM

## 2019-06-23 DIAGNOSIS — S82.832A CLOSED FRACTURE OF DISTAL END OF LEFT FIBULA, UNSPECIFIED FRACTURE MORPHOLOGY, INITIAL ENCOUNTER: Primary | ICD-10-CM

## 2019-06-23 PROCEDURE — 99283 EMERGENCY DEPT VISIT LOW MDM: CPT

## 2019-06-23 PROCEDURE — 73630 X-RAY EXAM OF FOOT: CPT

## 2019-06-23 PROCEDURE — 73610 X-RAY EXAM OF ANKLE: CPT

## 2019-06-23 RX ORDER — HYDROCODONE BITARTRATE AND ACETAMINOPHEN 5; 325 MG/1; MG/1
1 TABLET ORAL EVERY 8 HOURS PRN
Qty: 12 TABLET | Refills: 0 | Status: SHIPPED | OUTPATIENT
Start: 2019-06-23 | End: 2019-06-26

## 2019-06-23 ASSESSMENT — ENCOUNTER SYMPTOMS
SHORTNESS OF BREATH: 0
ABDOMINAL PAIN: 0
VOMITING: 0
COLOR CHANGE: 0
NAUSEA: 0
DIARRHEA: 0
CONSTIPATION: 0

## 2019-06-23 NOTE — ED NOTES
Bed: 25  Expected date:   Expected time:   Means of arrival:   Comments:  anthony Villarreal RN  06/23/19 9049

## 2019-06-23 NOTE — ED PROVIDER NOTES
4/5/16    lysis of adhesions       Medications:  Discharge Medication List as of 6/23/2019  3:44 PM      CONTINUE these medications which have NOT CHANGED    Details   pravastatin (PRAVACHOL) 20 MG tablet TAKE ONE TABLET BY MOUTH DAILY, Disp-90 tablet, R-2Normal      metoprolol tartrate (LOPRESSOR) 25 MG tablet TAKE 1/2 TABLET BY MOUTH TWO TIMES A DAY, Disp-90 tablet, R-3Normal      aspirin 81 MG EC tablet Take 1 tablet by mouth daily. , Disp-30 tablet, R-6               Review of Systems:  Review of Systems   Constitutional: Negative for chills, fatigue and fever. Respiratory: Negative for shortness of breath. Cardiovascular: Negative for chest pain. Gastrointestinal: Negative for abdominal pain, constipation, diarrhea, nausea and vomiting. Musculoskeletal: Positive for arthralgias. Negative for joint swelling. Skin: Negative for color change and wound. All other systems reviewed and are negative. Positives and Pertinent negatives as per HPI. Except as noted above in the ROS, all other systems were reviewed/completed and are negative. Physical Exam:  Physical Exam   Constitutional: He is oriented to person, place, and time. He appears well-developed and well-nourished. He is active and cooperative. Non-toxic appearance. HENT:   Head: Normocephalic. Right Ear: External ear normal.   Left Ear: External ear normal.   Nose: Nose normal.   Eyes: Conjunctivae are normal. Right eye exhibits no discharge. Left eye exhibits no discharge. Neck: Normal range of motion. Neck supple. Cardiovascular: Normal rate, regular rhythm and normal heart sounds. Exam reveals no gallop and no friction rub. No murmur heard. Pulses:       Dorsalis pedis pulses are 2+ on the left side. Posterior tibial pulses are 2+ on the left side. Pulmonary/Chest: Effort normal and breath sounds normal. No stridor. No respiratory distress. He has no wheezes. He has no rales.    Musculoskeletal: He exhibits edema and Type of Exam: Unknown; ORDERING SYSTEM PROVIDED HISTORY: injury TECHNOLOGIST PROVIDED HISTORY: Reason for exam:->injury Ordering Physician Provided Reason for Exam: Ankle Pain (Left foot injuy after falling off the side walk last night. Pt states they are able to stand on it but walking causes a lot of pain. Pt has been wearing a boot and cane to help while ambulate. Acuity: Unknown Type of Exam: Unknown FINDINGS: There is acute oblique minimally displaced fracture of the distal fibular metaphysis. The fracture line is above the level of the talar dome. Moderate spurring about the medial malleolus. Calcaneal spurring at the insertion of the Achilles. Tarsometatarsal alignment intact. Moderate degenerative changes 1st MTP joint. Interphalangeal joints intact. Acute distal fibular fracture. Xr Foot Left (min 3 Views)    Result Date: 6/23/2019  EXAMINATION: 3 XRAY VIEWS OF THE LEFT FOOT; 3 XRAY VIEWS OF THE LEFT ANKLE 6/23/2019 3:05 pm COMPARISON: None. HISTORY: ORDERING SYSTEM PROVIDED HISTORY: Injury TECHNOLOGIST PROVIDED HISTORY: Reason for exam:->Injury Ordering Physician Provided Reason for Exam: Ankle Pain (Left foot injuy after falling off the side walk last night. Pt states they are able to stand on it but walking causes a lot of pain. Pt has been wearing a boot and cane to help while ambulate. Acuity: Unknown Type of Exam: Unknown; ORDERING SYSTEM PROVIDED HISTORY: injury TECHNOLOGIST PROVIDED HISTORY: Reason for exam:->injury Ordering Physician Provided Reason for Exam: Ankle Pain (Left foot injuy after falling off the side walk last night. Pt states they are able to stand on it but walking causes a lot of pain. Pt has been wearing a boot and cane to help while ambulate. Acuity: Unknown Type of Exam: Unknown FINDINGS: There is acute oblique minimally displaced fracture of the distal fibular metaphysis. The fracture line is above the level of the talar dome.  Moderate spurring about the medial malleolus. Calcaneal spurring at the insertion of the Achilles. Tarsometatarsal alignment intact. Moderate degenerative changes 1st MTP joint. Interphalangeal joints intact. Acute distal fibular fracture. Vl Dup Carotid Bilateral    Result Date: 6/19/2019  Carotid Duplex Study  Demographics   Patient Name       Sri Head   Date of Study      06/19/2019         Gender              Male   Patient Number     [id number]         Date of Birth       1935   Visit Number       [id number]          Age                 82 year(s)   Accession Number   [id number]          Room Number   Corporate ID       [id number]           Sonographer         Pat Meadows,                                                            [address]   Ordering Physician Momo Muller  Interpreting        [facility]                     Myrna Avina CNP           Physician           Jessy Gomez MD,                                                            Adrienne Kemp  Procedure Type of Study:   Cerebral:Carotid, VL CAROTID DUPLEX BILATERAL. Vascular Sonographer Report  Additional Indications:Stenosis Impressions Right Impression The right internal carotid artery appears to have a <50% diameter reducing stenosis based on velocity criteria. The right vertebral artery demonstrates normal antegrade flow. The right subclavian artery is visualized and demonstrates multiphasic flow. Left Impression The left internal carotid artery appears to have a <50% diameter reducing stenosis based on velocity criteria. The left vertebral artery demonstrates normal antegrade flow. The left subclavian artery is visualized and demonstrates multiphasic flow. Conclusions   Summary   -The right internal carotid artery appears to have a <50% diameter reducing  stenosis based on velocity criteria. -The left internal carotid artery appears to have a <50% diameter reducing  stenosis based on velocity criteria.    Recommendations   -Recommend follow-up study in 12 months. Signature   ------------------------------------------------------------------  Electronically signed by Julia Pineda MD (Interpreting  physician) on 06/19/2019 at 12:53 PM  ------------------------------------------------------------------  Blood Pressure:Right arm 136/ mmHg. Left arm 138/ mmHg. Patient Status:Routine. [address] [place] [address] Vascular Lab. Technical Quality:Adequate visualization. Plaque   - A plaque was found in the Right ICA. Irregular. The plaque characteristics are: heterogeneous texture. - A plaque was found in the Left ICA. Smooth. The plaque characteristics are: heterogeneous texture. Velocities are measured in cm/s ; Diameters are measured in mm Carotid Right Measurements +---------------+----+----+-----+----+ ! Location       ! PSV ! EDV ! Angle! [place]  ! +---------------+----+----+-----+----+ ! Prox CCA       !116 !22. 4!60   !0.81! +---------------+----+----+-----+----+ ! Mid CCA        !104 !25. 2! 60   !0.76! +---------------+----+----+-----+----+ ! Dist CCA       !104 !23. 1! 60   !0.78! +---------------+----+----+-----+----+ ! Prox ICA       !95. 3!21. 6!60   !0.77! +---------------+----+----+-----+----+ ! Mid ICA        !79.6!22. 6!60   !0.72! +---------------+----+----+-----+----+ ! Dist ICA       !61.5!16. 7! 54   !0.73! +---------------+----+----+-----+----+ ! Prox ECA       ! 85  !    !60   !    ! +---------------+----+----+-----+----+ ! Vertebral      !51.7!12. 9!60   !0.75! +---------------+----+----+-----+----+ ! Prox Subclavian! 157 !    !48   !    ! +---------------+----+----+-----+----+   - There is antegrade vertebral flow noted on the right side. - Additional Measurements:ICAPSV/CCAPSV 0.92. ICAEDV/CCAEDV 1.01. Carotid Left Measurements +---------------+----+----+-----+----+ ! Location       ! PSV ! EDV ! Angle! [place]  ! +---------------+----+----+-----+----+ ! Prox CCA       !124 !21. 2! 60   !0.83! +---------------+----+----+-----+----+ ! Mid CCA        !101 !22. 4! 61 ! 0.78! +---------------+----+----+-----+----+ ! Dist CCA       !90  !25. 8!60   !0.71! +---------------+----+----+-----+----+ ! Prox ICA       !64.5!13. 2! 60   !0.8 ! +---------------+----+----+-----+----+ ! Mid ICA        !105 !27. 3!56   !0.74! +---------------+----+----+-----+----+ ! Dist ICA       !83. 9!21. 1! 60   !0.75! +---------------+----+----+-----+----+ ! Prox ECA       !66.6!    !60   !    ! +---------------+----+----+-----+----+ ! Vertebral      !42.6!    !60   !    ! +---------------+----+----+-----+----+ ! Prox Subclavian! 227 !    !50   !    ! +---------------+----+----+-----+----+   - There is antegrade vertebral flow noted on the left side. - Additional Measurements:ICAPSV/CCAPSV 1.04. ICAEDV/CCAEDV 1.29. MEDICAL DECISION MAKING / ED COURSE:      PROCEDURES:   Procedures    Patient was given:  Medications - No data to display  Patient presents to the emergency department with left ankle injury. Patient tender to medial and lateral malleolus with palpation and range of motion. Imaging does show evidence of an acute distal fibular fracture. He has normal peripheral pulses and distal sensation. He does have a walking boot that he has been using which I do feel like he is an appropriate replacement for a splint. He is to be nonweightbearing. He is agreeable to this plan of care. Referred to orthopedics. The patient tolerated their visit well. I have evaluated the patient with physician available for consultation as needed. I have discussed the findings of today's workup with the patient and addressed the patient's questions and concerns. Important warning signs as well as new or worsening symptoms which wouldnecessitate immediate return to the ED were discussed. The plan is to discharge from the ED at this time, and the patient is in stable condition. The patient acknowledged understanding is agreeable with this plan. CLINICAL IMPRESSION:  1.  Closed fracture of distal end of left fibula, unspecified fracture morphology, initial encounter        DISPOSITION Decision To Discharge 06/23/2019 03:44:31 PM      PATIENT REFERRED TO:  Tuyet Hernandez[address], [address]  Bhavna Mccain  [phone]    Schedule an appointment as soon as possible for a visit       [facility] Emergency Department  [address] [facility]  [phone]  Go to   If symptoms worsen      DISCHARGE MEDICATIONS:  Discharge Medication List as of 6/23/2019  3:44 PM      START taking these medications    Details   HYDROcodone-acetaminophen (NORCO) 5-325 MG per tablet Take 1 tablet by mouth every 8 hours as needed for Pain for up to 3 days. Intended supply: 3 days.  Take lowest dose possible to manage pain, Disp-12 tablet, R-0Print                    (Please note the MDM and HPI sections of this note were completed with avoice recognition program.  Efforts were made to edit the dictations but occasionally words are mis-transcribed.)    Electronically signed, GLENN Camarena,             GLENN Trinidad  06/23/19 9898

## 2019-06-23 NOTE — ED NOTES
Pt Discharged in stable condition, VSS, no signs of distress, discharge instructions and meds reviewed. Pt verbalizes understanding and states no further questions or concerns unaddressed.        Juliana Floyd RN  06/23/19 9725

## 2019-06-24 ENCOUNTER — TELEPHONE (OUTPATIENT)
Dept: CARDIOLOGY CLINIC | Age: 84
End: 2019-06-24

## 2019-06-24 NOTE — TELEPHONE ENCOUNTER
Attempted to call and speak with patient who was unavailable to speak with. Could not leave a message due to no name on vm. tc for message below.

## 2019-06-25 ENCOUNTER — OFFICE VISIT (OUTPATIENT)
Dept: ORTHOPEDIC SURGERY | Age: 84
End: 2019-06-25
Payer: MEDICARE

## 2019-06-25 VITALS
BODY MASS INDEX: 26.51 KG/M2 | RESPIRATION RATE: 16 BRPM | WEIGHT: 179 LBS | HEIGHT: 69 IN | SYSTOLIC BLOOD PRESSURE: 121 MMHG | DIASTOLIC BLOOD PRESSURE: 62 MMHG | HEART RATE: 64 BPM

## 2019-06-25 DIAGNOSIS — S82.62XA CLOSED DISPLACED FRACTURE OF LATERAL MALLEOLUS OF LEFT FIBULA, INITIAL ENCOUNTER: Primary | ICD-10-CM

## 2019-06-25 DIAGNOSIS — M25.571 RIGHT ANKLE PAIN, UNSPECIFIED CHRONICITY: ICD-10-CM

## 2019-06-25 PROCEDURE — 99203 OFFICE O/P NEW LOW 30 MIN: CPT | Performed by: ORTHOPAEDIC SURGERY

## 2019-06-25 NOTE — LETTER
[address] [facility]  Surgery Scheduling Form:      DEMOGRAPHICS:                                                                                                              .    Patient Name:  Elva Barrett  Patient :  1935   Patient SS#:      Patient Phone:  [phone] (home) [phone] (work) Alt. Patient Phone:    Patient Address:  [address] [address] [facility] [address]    PCP:  Barbie Lorenzo MD  Insurance:   Payor/Plan Subscr  Sex Relation Sub. Ins. ID Effective Group Num   1. BCBS MEDICAREArblaine Frausto 1935 Male Self [id number] [id number]                                    [id number]   Insurance ID Number:  DIAGNOSIS & PROCEDURE:                                                                                            .  Diagnosis:   Left lateral malleolus fracture S82. 62XA  Operation:  ORIF left lateral malleolus  [id number]  [place]  [facility]  [place]geon:  Becky Jules MD    SCHEDULING INFORMATION:                                                                                         .  Surgeon's Scheduling Instruction:  2019  Requested Date:  2019  OR Time:  9.15AM Patient Arrival Time:  7. 15AMOR Time Required:  60  Minutes  Anesthesia:  General    SA Required:  Yes  Equipment:  Gisela  Mini C-Arm:  No    Standard C-Arm:  Yes  Status:  Outpatient    PAT Required:  Yes  Latex Allergy:  unknown    Defibrilator or Pacemaker:  unknown  Isolation Precautions:  unknown  Comments:                       Rebel Dumont MD 19   BILLING INFORMATION:                                                                                                    .    Procedure:       CPT Code Modifier                  Pre-Certification:

## 2019-06-25 NOTE — PROGRESS NOTES
CHIEF COMPLAINT: Left ankle pain / lateral malleolus fracture. DATE OF INJURY: 6/23/2019    HISTORY:  Mr. Lisa Teresa 82 y.o.  male presents today for the first visit for evaluation of a left ankle injury which occurred when he was walking and tripped. He was first seen and evaluated in  , where he was x-rayed, splinted and asked to f/u with Orthopedics. He is complaining of medial & lateral ankle pain and swelling. This is better with elevation and worse with bearing any wt. The pain is sharp and not radiating. No numbness or tingling sensation. Alleviating factors: elevation and rest. No other complaint.      Past Medical History:   Diagnosis Date    Arthritis     CAD (coronary artery disease)     GERD (gastroesophageal reflux disease)     Hyperlipidemia     [facility]     [facility]ocardial infarct [facility]     40 years ago       Past Surgical History:   Procedure Laterality Date    ABDOMEN SURGERY  2013    bowel blockage    ABDOMINAL EXPLORATION SURGERY  1/3/13    CARDIAC SURGERY      4v----- 7/26/12    HERNIA REPAIR  4/5/2016    ventral    PROSTATE SURGERY      VEIN BYPASS SURGERY      VENTRAL HERNIA REPAIR Right 4/5/16    lysis of adhesions       Social History     Socioeconomic History    Marital status:      Spouse name: Not on file    Number of children: Not on file    Years of education: Not on file    Highest education level: Not on file   Occupational History    Not on file   Social Needs    Financial resource strain: Not on file    Food insecurity:     Worry: Not on file     Inability: Not on file    Transportation needs:     Medical: Not on file     Non-medical: Not on file   Tobacco Use    Smoking status: Never Smoker    Smokeless tobacco: Never Used   Substance and Sexual Activity    Alcohol use: No     Alcohol/week: 0.0 oz    Drug use: No    Sexual activity: Not Currently   Lifestyle    Physical activity:     Days per week: Not on file     Minutes per session: Not on file    Stress: Not on file   Relationships    Social connections:     Talks on phone: Not on file     Gets together: Not on file     Attends Confucianist service: Not on file     Active member of club or organization: Not on file     Attends meetings of clubs or organizations: Not on file     Relationship status: Not on file    Intimate partner violence:     Fear of current or ex partner: Not on file     Emotionally abused: Not on file     Physically abused: Not on file     Forced sexual activity: Not on file   Other Topics Concern    Not on file   Social History Narrative    Not on file       Family History   Problem Relation Age of Onset    Diabetes Father     Heart Disease Father     Heart Attack Father     Heart Disease Mother     Heart Attack Mother     Anesth Problems Neg Hx     Malig Hyperten Neg Hx     Hypotension Neg Hx     Malig Hypertherm Neg Hx     Pseudochol. Deficiency Neg Hx        Current Outpatient Medications on File Prior to Visit   Medication Sig Dispense Refill    HYDROcodone-acetaminophen (NORCO) 5-325 MG per tablet Take 1 tablet by mouth every 8 hours as needed for Pain for up to 3 days. Intended supply: 3 days. Take lowest dose possible to manage pain 12 tablet 0    pravastatin (PRAVACHOL) 20 MG tablet TAKE ONE TABLET BY MOUTH DAILY 90 tablet 2    metoprolol tartrate (LOPRESSOR) 25 MG tablet TAKE 1/2 TABLET BY MOUTH TWO TIMES A DAY (Patient taking differently: TAKE 1 TABLET BY MOUTH QD) 90 tablet 3    aspirin 81 MG EC tablet Take 1 tablet by mouth daily. 30 tablet 6     No current facility-administered medications on file prior to visit. Pertinent items are noted in HPI  Review of systems reviewed from Patient History Form dated on 6/25/2019 and available in the patient's chart under the Media tab. PHYSICAL EXAMINATION:  Mr. Hernan Samaniego is a very pleasant 82 y.o.  male who presents today in no acute distress, awake, alert, and oriented. He is well dressed, nourished and  groomed. Patient with normal affect. Height is  5' 9\" (1.753 m), weight is 179 lb (81.2 kg), Body mass index is 26.43 kg/m². Resting respiratory rate is 16. Examination of the gait, showed that the patient walks with WB left leg and in a boot. Examination of both ankles showing a decreased range of motion of the left ankle compare to the other side. There is moderate swelling that can be seen, as well as ecchymosis. He has intact sensation and good pedal pulses. He has significant tenderness on deep palpation over the medial & lateral malleolus of the left ankle. IMAGING: Xrays, 3 views of the left ankle taken today in the office, were reviewed, and showed a moderately displaced lateral malleolus fracture. IMPRESSION: Left ankle moderately displaced lateral malleolus fracture. PLAN:   I discussed with Lupe Roblero the overall alignment of the fracture and treatment options including both surgical and non-surgical treatment, and that my recommendation is an open reduction and internal fixation given the amount of displacement and instability of the fracture. I discussed the risks and benefits of surgery with the patient, including but not limited to infection, bleeding, pain, injury to nerves or blood vessels failure of the surgery and need for additional surgery. All the patient's questions were answered. We discussed an expected post-operative course. He  is understanding of this and wishes to proceed.        Adam Radford MD

## 2019-06-26 PROBLEM — S82.62XA CLOSED DISPLACED FRACTURE OF LATERAL MALLEOLUS OF LEFT FIBULA: Status: ACTIVE | Noted: 2019-06-26

## 2019-06-26 RX ORDER — CEPHALEXIN 500 MG/1
500 CAPSULE ORAL 4 TIMES DAILY
Qty: 20 CAPSULE | Refills: 0 | Status: SHIPPED | OUTPATIENT
Start: 2019-06-26 | End: 2019-10-23

## 2019-06-26 RX ORDER — TRAMADOL HYDROCHLORIDE 50 MG/1
50 TABLET ORAL 2 TIMES DAILY
Qty: 60 TABLET | Refills: 0 | Status: SHIPPED | OUTPATIENT
Start: 2019-06-26 | End: 2019-07-06

## 2019-06-26 NOTE — PROGRESS NOTES
Name_______________________________________Printed:____________________  Date and time of surgery_______6/27/19 0800_________________Arrival Time:_____0630 masc___________   1. Do not eat or drink anything after 12 midnight (or____hours) prior to surgery. This includes no water, chewing gum or mints. Endoscopy patients follow your doctors bowel prep instructions,which may include taking part of prep after midnight. 2. Take the following pills with a small sip of water on the morning of surgery__________metoprolol_________________________________________                  Do not take blood pressure medications ending in pril or sartan the ezekiel prior to surgery or the morning of surgery_   3. Aspirin, Ibuprofen, Advil, Naproxen, Vitamin E and other Anti-inflammatory products should be stopped for 5 days before surgery or as directed by your physician. 4. Check with your Doctor regarding stopping Plavix, Coumadin,Eliquis, Lovenox,Effient,Pradaxa,Xarelto, Fragmin or other blood thinners and follow their instructions. 5. Do not smoke, and do not drink any alcoholic beverages 24 hours prior to surgery. This includes NA Beer. Refrain from the usage of any recreational drugs. 6. You may brush your teeth and gargle the morning of surgery. DO NOT SWALLOW WATER   7. You MUST make arrangements for a responsible adult to stay on site while you are here and take you home after your surgery. You will not be allowed to leave alone or drive yourself home. It is strongly suggested someone stay with you the first 24 hrs. Your surgery will be cancelled if you do not have a ride home. 8. A parent/legal guardian must accompany a child scheduled for surgery and plan to stay at the hospital until the child is discharged. Please do not bring other children with you.    9. Please wear simple, loose fitting clothing to the hospital.  Do not bring valuables (money, credit cards, checkbooks, etc.) Do not wear any makeup (including no eye makeup) or nail polish on your fingers or toes. 10. DO NOT wear any jewelry or piercings on day of surgery. All body piercing jewelry must be removed. 11. If you have ___dentures, they will be removed before going to the OR; we will provide you a container. If you wear ___contact lenses or ___glasses, they will be removed; please bring a case for them. 12. Please see your family doctor/pediatrician for a history & physical and/or concerning medications. Bring any test results/reports from your physician's office. PCP________x__________Phone___________H&P Appt. Date________             13 If you  have a Living Will and Durable Power of  for Healthcare, please bring in a copy. 15. Notify your Surgeon if you develop any illness between now and surgery  time, cough, cold, fever, sore throat, nausea, vomiting, etc.  Please notify your surgeon if you experience dizziness, shortness of breath or blurred vision between now & the time of your surgery             15. DO NOT shave your operative site 96 hours prior to surgery. For face & neck surgery, men may use an electric razor 48 hours prior to surgery. 16. Shower the night before surgery with ___Antibacterial soap ___Hibiclens             17. To provide excellent care visitors will be limited to one in the room at any given time. 18.  Please bring picture ID and insurance card. 19.  Visit our web site for additional information:  [email or web address]              20.During flu season no children under the age of 15 are permitted in the hospital for the safety of all patients.                               21. If you take a long acting insulin in the evening only  take half of your usual  dose the night  before your procedure              22. If you use a c-pap please bring DOS if staying overnight,             23.For your convenience [facility] has a pharmacy on site to fill your prescriptions. 24. If you use oxygen and have a portable tank please bring it  with you the DOS             25. Bring a complete list of all your medications with name and dose include any supplements. 26. Other__________________________________________   *Please call pre admission testing if you any further questions   Jose Guadalupe Vergara [address]    [facility] [place]. [facility]  [phone]   [address] [place] [address]       All above information reviewed with patient in person or by phone. Patient verbalizes understanding. All questions and concerns addressed.                                                                                                  Patient/Rep_________pt___________                                                                                                                                    PRE OP INSTRUCTIONS

## 2019-06-27 ENCOUNTER — HOSPITAL ENCOUNTER (OUTPATIENT)
Age: 84
Setting detail: OUTPATIENT SURGERY
Discharge: HOME | End: 2019-06-27
Attending: ORTHOPAEDIC SURGERY | Admitting: ORTHOPAEDIC SURGERY
Payer: COMMERCIAL

## 2019-06-27 ENCOUNTER — APPOINTMENT (OUTPATIENT)
Dept: GENERAL RADIOLOGY | Age: 84
End: 2019-06-27
Attending: ORTHOPAEDIC SURGERY
Payer: COMMERCIAL

## 2019-06-27 ENCOUNTER — ANESTHESIA (OUTPATIENT)
Dept: OPERATING ROOM | Age: 84
End: 2019-06-27
Payer: COMMERCIAL

## 2019-06-27 ENCOUNTER — ANESTHESIA EVENT (OUTPATIENT)
Dept: OPERATING ROOM | Age: 84
End: 2019-06-27
Payer: COMMERCIAL

## 2019-06-27 VITALS
OXYGEN SATURATION: 99 % | RESPIRATION RATE: 11 BRPM | SYSTOLIC BLOOD PRESSURE: 125 MMHG | DIASTOLIC BLOOD PRESSURE: 73 MMHG

## 2019-06-27 VITALS
RESPIRATION RATE: 16 BRPM | HEIGHT: 69 IN | DIASTOLIC BLOOD PRESSURE: 75 MMHG | WEIGHT: 165 LBS | HEART RATE: 69 BPM | SYSTOLIC BLOOD PRESSURE: 130 MMHG | BODY MASS INDEX: 24.44 KG/M2 | OXYGEN SATURATION: 93 % | TEMPERATURE: 97.3 F

## 2019-06-27 PROCEDURE — 2500000003 HC RX 250 WO HCPCS: Performed by: NURSE ANESTHETIST, CERTIFIED REGISTERED

## 2019-06-27 PROCEDURE — 6370000000 HC RX 637 (ALT 250 FOR IP): Performed by: FAMILY MEDICINE

## 2019-06-27 PROCEDURE — 2500000003 HC RX 250 WO HCPCS: Performed by: ORTHOPAEDIC SURGERY

## 2019-06-27 PROCEDURE — 73600 X-RAY EXAM OF ANKLE: CPT

## 2019-06-27 PROCEDURE — 6360000002 HC RX W HCPCS: Performed by: ORTHOPAEDIC SURGERY

## 2019-06-27 PROCEDURE — C1713 ANCHOR/SCREW BN/BN,TIS/BN: HCPCS | Performed by: ORTHOPAEDIC SURGERY

## 2019-06-27 PROCEDURE — 2720000010 HC SURG SUPPLY STERILE: Performed by: ORTHOPAEDIC SURGERY

## 2019-06-27 PROCEDURE — 3700000001 HC ADD 15 MINUTES (ANESTHESIA): Performed by: ORTHOPAEDIC SURGERY

## 2019-06-27 PROCEDURE — 7100000010 HC PHASE II RECOVERY - FIRST 15 MIN: Performed by: ORTHOPAEDIC SURGERY

## 2019-06-27 PROCEDURE — 2709999900 HC NON-CHARGEABLE SUPPLY: Performed by: ORTHOPAEDIC SURGERY

## 2019-06-27 PROCEDURE — 7100000000 HC PACU RECOVERY - FIRST 15 MIN: Performed by: ORTHOPAEDIC SURGERY

## 2019-06-27 PROCEDURE — 6370000000 HC RX 637 (ALT 250 FOR IP): Performed by: ORTHOPAEDIC SURGERY

## 2019-06-27 PROCEDURE — 3600000014 HC SURGERY LEVEL 4 ADDTL 15MIN: Performed by: ORTHOPAEDIC SURGERY

## 2019-06-27 PROCEDURE — 2580000003 HC RX 258: Performed by: FAMILY MEDICINE

## 2019-06-27 PROCEDURE — 6360000002 HC RX W HCPCS: Performed by: NURSE ANESTHETIST, CERTIFIED REGISTERED

## 2019-06-27 PROCEDURE — 7100000001 HC PACU RECOVERY - ADDTL 15 MIN: Performed by: ORTHOPAEDIC SURGERY

## 2019-06-27 PROCEDURE — 7100000011 HC PHASE II RECOVERY - ADDTL 15 MIN: Performed by: ORTHOPAEDIC SURGERY

## 2019-06-27 PROCEDURE — 3700000000 HC ANESTHESIA ATTENDED CARE: Performed by: ORTHOPAEDIC SURGERY

## 2019-06-27 PROCEDURE — 6360000002 HC RX W HCPCS: Performed by: FAMILY MEDICINE

## 2019-06-27 PROCEDURE — 27792 TREATMENT OF ANKLE FRACTURE: CPT | Performed by: ORTHOPAEDIC SURGERY

## 2019-06-27 PROCEDURE — 3600000004 HC SURGERY LEVEL 4 BASE: Performed by: ORTHOPAEDIC SURGERY

## 2019-06-27 PROCEDURE — 3209999900 FLUORO FOR SURGICAL PROCEDURES

## 2019-06-27 DEVICE — SCREW BNE L16MM DIA2.7MM HEX HD DIA2.5MM CANC BIODUR 108C: Type: IMPLANTABLE DEVICE | Site: ANKLE | Status: FUNCTIONAL

## 2019-06-27 DEVICE — SCREW BNE L20MM DIA3.5MM HD DIA2.7MM CORT PERIARTC S STL ST: Type: IMPLANTABLE DEVICE | Site: ANKLE | Status: FUNCTIONAL

## 2019-06-27 DEVICE — SCREW BNE L20MM DIA2.7MM HEX HD DIA2.5MM CANC BIODUR 108C: Type: IMPLANTABLE DEVICE | Site: ANKLE | Status: FUNCTIONAL

## 2019-06-27 DEVICE — SCREW BNE L18MM DIA2.7MM HEX HD DIA2.5MM CANC BIODUR 108C: Type: IMPLANTABLE DEVICE | Site: ANKLE | Status: FUNCTIONAL

## 2019-06-27 DEVICE — PLATE BNE L80MM 4 H L LAT DST PERIARTC FIBULAR S STL LOK: Type: IMPLANTABLE DEVICE | Site: ANKLE | Status: FUNCTIONAL

## 2019-06-27 DEVICE — SCREW BNE L16MM DIA3.5MM HD DIA2.7MM PERIARTC CORT S STL ST: Type: IMPLANTABLE DEVICE | Site: ANKLE | Status: FUNCTIONAL

## 2019-06-27 DEVICE — SCREW BNE L18MM DIA3.5MM HD DIA2.7MM PERIARTC CORT S STL ST: Type: IMPLANTABLE DEVICE | Site: ANKLE | Status: FUNCTIONAL

## 2019-06-27 RX ORDER — HYDROMORPHONE HCL 110MG/55ML
0.25 PATIENT CONTROLLED ANALGESIA SYRINGE INTRAVENOUS EVERY 5 MIN PRN
Status: DISCONTINUED | OUTPATIENT
Start: 2019-06-27 | End: 2019-06-27 | Stop reason: HOSPADM

## 2019-06-27 RX ORDER — FENTANYL CITRATE 50 UG/ML
INJECTION, SOLUTION INTRAMUSCULAR; INTRAVENOUS PRN
Status: DISCONTINUED | OUTPATIENT
Start: 2019-06-27 | End: 2019-06-27 | Stop reason: SDUPTHER

## 2019-06-27 RX ORDER — SODIUM CHLORIDE 0.9 % (FLUSH) 0.9 %
10 SYRINGE (ML) INJECTION PRN
Status: DISCONTINUED | OUTPATIENT
Start: 2019-06-27 | End: 2019-06-27 | Stop reason: HOSPADM

## 2019-06-27 RX ORDER — FENTANYL CITRATE 50 UG/ML
50 INJECTION, SOLUTION INTRAMUSCULAR; INTRAVENOUS EVERY 5 MIN PRN
Status: DISCONTINUED | OUTPATIENT
Start: 2019-06-27 | End: 2019-06-27 | Stop reason: HOSPADM

## 2019-06-27 RX ORDER — DIPHENHYDRAMINE HYDROCHLORIDE 50 MG/ML
12.5 INJECTION INTRAMUSCULAR; INTRAVENOUS
Status: DISCONTINUED | OUTPATIENT
Start: 2019-06-27 | End: 2019-06-27 | Stop reason: HOSPADM

## 2019-06-27 RX ORDER — PROPOFOL 10 MG/ML
INJECTION, EMULSION INTRAVENOUS PRN
Status: DISCONTINUED | OUTPATIENT
Start: 2019-06-27 | End: 2019-06-27 | Stop reason: SDUPTHER

## 2019-06-27 RX ORDER — SODIUM CHLORIDE 0.9 % (FLUSH) 0.9 %
10 SYRINGE (ML) INJECTION EVERY 12 HOURS SCHEDULED
Status: DISCONTINUED | OUTPATIENT
Start: 2019-06-27 | End: 2019-06-27 | Stop reason: HOSPADM

## 2019-06-27 RX ORDER — LABETALOL HYDROCHLORIDE 5 MG/ML
5 INJECTION, SOLUTION INTRAVENOUS EVERY 10 MIN PRN
Status: DISCONTINUED | OUTPATIENT
Start: 2019-06-27 | End: 2019-06-27 | Stop reason: HOSPADM

## 2019-06-27 RX ORDER — LIDOCAINE HYDROCHLORIDE 20 MG/ML
INJECTION, SOLUTION EPIDURAL; INFILTRATION; INTRACAUDAL; PERINEURAL PRN
Status: DISCONTINUED | OUTPATIENT
Start: 2019-06-27 | End: 2019-06-27 | Stop reason: SDUPTHER

## 2019-06-27 RX ORDER — PROMETHAZINE HYDROCHLORIDE 25 MG/ML
6.25 INJECTION, SOLUTION INTRAMUSCULAR; INTRAVENOUS PRN
Status: DISCONTINUED | OUTPATIENT
Start: 2019-06-27 | End: 2019-06-27 | Stop reason: HOSPADM

## 2019-06-27 RX ORDER — METOPROLOL SUCCINATE 25 MG/1
25 TABLET, EXTENDED RELEASE ORAL DAILY
Status: DISCONTINUED | OUTPATIENT
Start: 2019-06-27 | End: 2019-06-27 | Stop reason: HOSPADM

## 2019-06-27 RX ORDER — OXYCODONE HYDROCHLORIDE 5 MG/1
5 TABLET ORAL PRN
Status: COMPLETED | OUTPATIENT
Start: 2019-06-27 | End: 2019-06-27

## 2019-06-27 RX ORDER — MEPERIDINE HYDROCHLORIDE 25 MG/ML
12.5 INJECTION INTRAMUSCULAR; INTRAVENOUS; SUBCUTANEOUS EVERY 5 MIN PRN
Status: DISCONTINUED | OUTPATIENT
Start: 2019-06-27 | End: 2019-06-27 | Stop reason: HOSPADM

## 2019-06-27 RX ORDER — OXYCODONE HYDROCHLORIDE 5 MG/1
TABLET ORAL
Status: DISCONTINUED
Start: 2019-06-27 | End: 2019-06-27 | Stop reason: HOSPADM

## 2019-06-27 RX ORDER — HYDROMORPHONE HCL 110MG/55ML
0.5 PATIENT CONTROLLED ANALGESIA SYRINGE INTRAVENOUS EVERY 5 MIN PRN
Status: DISCONTINUED | OUTPATIENT
Start: 2019-06-27 | End: 2019-06-27 | Stop reason: HOSPADM

## 2019-06-27 RX ORDER — SODIUM CHLORIDE 9 MG/ML
INJECTION, SOLUTION INTRAVENOUS CONTINUOUS
Status: DISCONTINUED | OUTPATIENT
Start: 2019-06-27 | End: 2019-06-27 | Stop reason: HOSPADM

## 2019-06-27 RX ORDER — CEFAZOLIN SODIUM 2 G/100ML
2 INJECTION, SOLUTION INTRAVENOUS
Status: COMPLETED | OUTPATIENT
Start: 2019-06-27 | End: 2019-06-27

## 2019-06-27 RX ORDER — OXYCODONE HYDROCHLORIDE 5 MG/1
10 TABLET ORAL PRN
Status: COMPLETED | OUTPATIENT
Start: 2019-06-27 | End: 2019-06-27

## 2019-06-27 RX ORDER — HYDROMORPHONE HCL 110MG/55ML
PATIENT CONTROLLED ANALGESIA SYRINGE INTRAVENOUS
Status: DISCONTINUED
Start: 2019-06-27 | End: 2019-06-27 | Stop reason: HOSPADM

## 2019-06-27 RX ORDER — BUPIVACAINE HYDROCHLORIDE 5 MG/ML
INJECTION, SOLUTION EPIDURAL; INTRACAUDAL
Status: COMPLETED | OUTPATIENT
Start: 2019-06-27 | End: 2019-06-27

## 2019-06-27 RX ORDER — ONDANSETRON 2 MG/ML
INJECTION INTRAMUSCULAR; INTRAVENOUS PRN
Status: DISCONTINUED | OUTPATIENT
Start: 2019-06-27 | End: 2019-06-27 | Stop reason: SDUPTHER

## 2019-06-27 RX ORDER — DEXAMETHASONE SODIUM PHOSPHATE 4 MG/ML
INJECTION, SOLUTION INTRA-ARTICULAR; INTRALESIONAL; INTRAMUSCULAR; INTRAVENOUS; SOFT TISSUE PRN
Status: DISCONTINUED | OUTPATIENT
Start: 2019-06-27 | End: 2019-06-27 | Stop reason: SDUPTHER

## 2019-06-27 RX ADMIN — PHENYLEPHRINE HYDROCHLORIDE 100 MCG: 10 INJECTION INTRAVENOUS at 07:45

## 2019-06-27 RX ADMIN — FENTANYL CITRATE 25 MCG: 50 INJECTION, SOLUTION INTRAMUSCULAR; INTRAVENOUS at 08:02

## 2019-06-27 RX ADMIN — PHENYLEPHRINE HYDROCHLORIDE 100 MCG: 10 INJECTION INTRAVENOUS at 07:56

## 2019-06-27 RX ADMIN — SODIUM CHLORIDE: 9 INJECTION, SOLUTION INTRAVENOUS at 07:08

## 2019-06-27 RX ADMIN — METOPROLOL SUCCINATE 25 MG: 25 TABLET, FILM COATED, EXTENDED RELEASE ORAL at 07:22

## 2019-06-27 RX ADMIN — HYDROMORPHONE HYDROCHLORIDE 0.5 MG: 2 INJECTION, SOLUTION INTRAMUSCULAR; INTRAVENOUS; SUBCUTANEOUS at 09:05

## 2019-06-27 RX ADMIN — OXYCODONE HYDROCHLORIDE 10 MG: 5 TABLET ORAL at 09:14

## 2019-06-27 RX ADMIN — PROPOFOL 120 MG: 10 INJECTION, EMULSION INTRAVENOUS at 07:45

## 2019-06-27 RX ADMIN — FENTANYL CITRATE 50 MCG: 50 INJECTION, SOLUTION INTRAMUSCULAR; INTRAVENOUS at 07:45

## 2019-06-27 RX ADMIN — ONDANSETRON 4 MG: 2 INJECTION INTRAMUSCULAR; INTRAVENOUS at 07:45

## 2019-06-27 RX ADMIN — CEFAZOLIN SODIUM 2 G: 2 INJECTION, SOLUTION INTRAVENOUS at 07:35

## 2019-06-27 RX ADMIN — DEXAMETHASONE SODIUM PHOSPHATE 8 MG: 4 INJECTION, SOLUTION INTRA-ARTICULAR; INTRALESIONAL; INTRAMUSCULAR; INTRAVENOUS; SOFT TISSUE at 07:45

## 2019-06-27 RX ADMIN — FENTANYL CITRATE 25 MCG: 50 INJECTION, SOLUTION INTRAMUSCULAR; INTRAVENOUS at 08:24

## 2019-06-27 RX ADMIN — HYDROMORPHONE HYDROCHLORIDE 0.5 MG: 2 INJECTION, SOLUTION INTRAMUSCULAR; INTRAVENOUS; SUBCUTANEOUS at 08:56

## 2019-06-27 RX ADMIN — LIDOCAINE HYDROCHLORIDE 60 MG: 20 INJECTION, SOLUTION EPIDURAL; INFILTRATION; INTRACAUDAL; PERINEURAL at 07:45

## 2019-06-27 RX ADMIN — HYDROMORPHONE HYDROCHLORIDE 0.5 MG: 2 INJECTION, SOLUTION INTRAMUSCULAR; INTRAVENOUS; SUBCUTANEOUS at 09:13

## 2019-06-27 RX ADMIN — PHENYLEPHRINE HYDROCHLORIDE 100 MCG: 10 INJECTION INTRAVENOUS at 08:09

## 2019-06-27 ASSESSMENT — PULMONARY FUNCTION TESTS
PIF_VALUE: 4
PIF_VALUE: 5
PIF_VALUE: 4
PIF_VALUE: 16
PIF_VALUE: 5
PIF_VALUE: 4
PIF_VALUE: 4
PIF_VALUE: 3
PIF_VALUE: 3
PIF_VALUE: 4
PIF_VALUE: 0
PIF_VALUE: 16
PIF_VALUE: 3
PIF_VALUE: 4
PIF_VALUE: 5
PIF_VALUE: 4
PIF_VALUE: 5
PIF_VALUE: 5
PIF_VALUE: 4
PIF_VALUE: 4
PIF_VALUE: 5
PIF_VALUE: 4
PIF_VALUE: 0
PIF_VALUE: 4
PIF_VALUE: 5
PIF_VALUE: 3
PIF_VALUE: 0
PIF_VALUE: 4
PIF_VALUE: 0
PIF_VALUE: 5
PIF_VALUE: 3
PIF_VALUE: 4
PIF_VALUE: 5
PIF_VALUE: 4
PIF_VALUE: 4
PIF_VALUE: 5
PIF_VALUE: 4
PIF_VALUE: 4
PIF_VALUE: 5
PIF_VALUE: 4
PIF_VALUE: 0
PIF_VALUE: 4
PIF_VALUE: 5
PIF_VALUE: 0
PIF_VALUE: 4

## 2019-06-27 ASSESSMENT — PAIN SCALES - GENERAL
PAINLEVEL_OUTOF10: 7
PAINLEVEL_OUTOF10: 8
PAINLEVEL_OUTOF10: 7
PAINLEVEL_OUTOF10: 8

## 2019-06-27 ASSESSMENT — PAIN DESCRIPTION - DESCRIPTORS: DESCRIPTORS: ACHING

## 2019-06-27 ASSESSMENT — PAIN - FUNCTIONAL ASSESSMENT
PAIN_FUNCTIONAL_ASSESSMENT: ACTIVITIES ARE NOT PREVENTED
PAIN_FUNCTIONAL_ASSESSMENT: 0-10

## 2019-06-27 NOTE — PROGRESS NOTES
Pt awake and alert. Pt on RA, VSS. Wife at bedside. Pt with c/o pain (see MAR), denies nausea, tolerating PO. Skin warm LLE, able to wiggle toes. Pt meets criteria to be discharged from phase 1.

## 2019-06-27 NOTE — BRIEF OP NOTE
Brief Postoperative Note  ______________________________________________________________    Patient: Kendra Lopes  YOB: 1935  MRN: [id number]  Date of Procedure: 6/27/2019    Pre-Op Diagnosis: LEFT LATERAL MALLEOLUS FRACTURE S82. 62XA    Post-Op Diagnosis: Same       Procedure(s):  LEFT LATERAL MALLEOLUS ANKLE OPEN REDUCTION INTERNAL FIXATION -Jaylyn Rausch    Anesthesia: General    Surgeon(s):  Mouna Arnold MD    Assistant: Sybil Lange    Estimated Blood Loss (mL): less than 50     Complications: None    Specimens:   * No specimens in log *    Implants:  Implant Name Type Inv.  Item Serial No.  Lot No. LRB No. Used   PLATE FIBULA LK DISTL 4 HL LT 80MM Screw/Plate/Nail/Sly PLATE FIBULA LK DISTL 4 HL LT 80MM  [employer]  Left 1   SCREW CORTCL PERIART FTHRD 3.5X16MM Screw/Plate/Nail/Sly SCREW CORTCL PERIART FTHRD 3.5X16MM  [employer]  Left 1   SCREW CORTCL PERIART FTHRD 3.5X18MM Screw/Plate/Nail/Sly SCREW CORTCL PERIART FTHRD 3.5X18MM  [employer]  Left 2   SCREW CORTCL PERIART FTHRD 3.5X20MM Screw/Plate/Nail/Sly SCREW CORTCL PERIART FTHRD 3.5X20MM  [employer]  Left 1   SCREW LK SYS UNIV 2.7X16MM Screw/Plate/Nail/Sly SCREW LK SYS UNIV 2.7X16MM  [employer]  Left 1   SCREW LK SYS UNIV 2.7X18MM Screw/Plate/Nail/Sly SCREW LK SYS UNIV 2.7X18MM  [employer]  Left 1   SCREW LK SYS UNIV 2.7X20MM Screw/Plate/Nail/Sly SCREW LK SYS UNIV 2.7X20MM  [employer]  Left 3         Drains:   Urethral Catheter 18 fr (Active)       Findings: Same    Mouna Arnold MD  Date: 6/27/2019  Time: 8:38 AM

## 2019-06-27 NOTE — PROGRESS NOTES
Discharge instructions reviewed with patient/wife. All home medications have been reviewed, questions answered and patient verbalized understanding. Discharge instructions signed. Pt dc'd per wheelchair. Pt states that they have a walker and knee scooter at home. Patient discharged home with belongings. Wife taking stable pt home.

## 2019-06-27 NOTE — H&P
Preoperative H&P Update    The patient's History and Physical in the medical record from 6/25/2019 was reviewed by me today. Past Medical History:   Diagnosis Date    Arthritis     CAD (coronary artery disease)     GERD (gastroesophageal reflux disease)     Hyperlipidemia     [facility] [address]     Myocardial infarct ([facility] [address]     40 years ago     Past Surgical History:   Procedure Laterality Date    ABDOMEN SURGERY  2013    bowel blockage    ABDOMINAL EXPLORATION SURGERY  1/3/13    CARDIAC SURGERY      4v----- 7/26/12    HERNIA REPAIR  4/5/2016    ventral    PROSTATE SURGERY      VEIN BYPASS SURGERY      VENTRAL HERNIA REPAIR Right 4/5/16    lysis of adhesions     No current facility-administered medications on file prior to encounter. Current Outpatient Medications on File Prior to Encounter   Medication Sig Dispense Refill    cephALEXin (KEFLEX) 500 MG capsule Take 1 capsule by mouth 4 times daily 20 capsule 0    traMADol (ULTRAM) 50 MG tablet Take 1 tablet by mouth 2 times daily for 10 days. 60 tablet 0    pravastatin (PRAVACHOL) 20 MG tablet TAKE ONE TABLET BY MOUTH DAILY 90 tablet 2    metoprolol tartrate (LOPRESSOR) 25 MG tablet TAKE 1/2 TABLET BY MOUTH TWO TIMES A DAY (Patient taking differently: TAKE 1 TABLET BY MOUTH QD) 90 tablet 3    aspirin 81 MG EC tablet Take 1 tablet by mouth daily. 30 tablet 6       No Known Allergies   I reviewed the HPI, medications, allergies, reason for surgery, diagnosis and treatment plan and there has been no change. The patient was evaluated by me today. Physical exam findings for this update include: There were no vitals filed for this visit.   Airway is intact  Chest: chest clear, no wheezing, rales, normal symmetric air entry, no tachypnea, retractions or cyanosis  Heart: regular rate and rhythm ; heart sounds normal  Findings on exam of the body region where surgery is to be performed include:  Left ankle lateral malleolus

## 2019-06-27 NOTE — OP NOTE
[place] [address]                     [facility], [address] [place] [address]                                OPERATIVE REPORT    PATIENT NAME: Ernesto Hernandez                    :        1935  MED REC NO:   [id number]                          ROOM:  ACCOUNT NO:   [de-identified]                           ADMIT DATE: 2019  PROVIDER:     Lilian Self MD    DATE OF PROCEDURE:  2019    PRIMARY CARE PHYSICIAN:  Priya Canas MD.    SURGEON:  Lilian Self M.D.    ASSISTANT:  Rodolfo Rodriguez CNP. PREOPERATIVE DIAGNOSIS:  Left ankle lateral malleolus displaced  fracture. POSTOPERATIVE DIAGNOSIS:  Left ankle lateral malleolus displaced  fracture. OPERATION PERFORMED:  Open treatment of left ankle lateral malleolus  fracture with open reduction and internal fixation. ANESTHESIA:  General anesthesia. ESTIMATED BLOOD LOSS:  Minimal.    COMPLICATIONS:  None. TOURNIQUET:  Left upper calf, 250 mmHg. IMPLANT USED:  Gisela distal fibula locking plate with one lag screw. INDICATIONS:  This is an 21-year-old white male, who has been fairly  active, sustained a fall with a twisting injury to his left ankle. He  was found to have a displaced lateral malleolus fracture. All risks,  benefits, and alternatives were discussed with the patient including an  operative treatment. He elected to proceed with the surgical treatment. DESCRIPTION OF THE PROCEDURE:  The patient's left ankle was marked. He  received 2 gm Ancef IV preoperatively. The patient was then brought to  the operating room, underwent general anesthesia. A well-padded  tourniquet was placed, left upper calf. The left lower extremity was  then prepped and draped in regular sterile routine fashion. A time-out  was called to confirm the patient's name, site, and procedure. Esmarch was used for exsanguination. Tourniquet was inflated to 250  mmHg. A lateral incision was made over the distal fibula. The fracture  was then exposed and was found to be moderately displaced. We were able  to clean up the fracture site and reduced it anatomically. While  maintaining the reduction, we put one lag screw from anterior to  posterior and that stabilized the fracture professionally. We then put  the plate in appropriate position. We then put one screw in the oblong  hole and then we locked it in place with a total of five distal 2.7  locking screws. We added two more cortical screws in the shaft. Overall, we are very satisfied with the anatomic reduction and position  of all the screws. At this point, we let the tourniquet down. Hemostasis was secured. We irrigated the incision copiously with normal  saline. We closed the deep layer with 2-0 and subcue with 3-0 Vicryl  and skin with 4-0 Monocryl. Steri-Strip was then applied. Dressing was then applied in the form of Xeroform, 4 x 4, sterile  Webril, and a splint was applied. The patient tolerated the procedure well, was taken to the recovery in  stable condition. Heather Chappell CNP was 1st Assist given the nature of the procedure that needed advanced assistance. POSTOPERATIVE PLAN:  The patient will be discharged home. He will be  nonweightbearing for three to six weeks, but we can start range of  motion in two weeks.         Mariela Rivera MD    D: 06/27/2019 8:48:20       T: 06/27/2019 9:00:31     [email or web address]  Job#: [id number]     Doc#: [id number]    CC:  Alice Shelton MD

## 2019-06-27 NOTE — ANESTHESIA PRE PROCEDURE
Department of Anesthesiology  Preprocedure Note       Name:  Rosalino Mcfarland   Age:  82 y.o.  :  1935                                          MRN:  [id number]         Date:  2019      Surgeon: Brynn Alonso):  Dacia Jang MD    Procedure: LEFT LATERAL MALLEOLUS ANKLE OPEN REDUCTION INTERNAL FIXATION -Luisa Woodgate (Left Ankle)    Medications prior to admission:   Prior to Admission medications    Medication Sig Start Date End Date Taking? Authorizing Provider   cephALEXin (KEFLEX) 500 MG capsule Take 1 capsule by mouth 4 times daily 19   Dacia Jang MD   traMADol (ULTRAM) 50 MG tablet Take 1 tablet by mouth 2 times daily for 10 days. 19  Dacia Jang MD   pravastatin (PRAVACHOL) 20 MG tablet TAKE ONE TABLET BY MOUTH DAILY 10/15/18   FILIBERTO Hernandez CNP   metoprolol tartrate (LOPRESSOR) 25 MG tablet TAKE 1/2 TABLET BY MOUTH TWO TIMES A DAY  Patient taking differently: TAKE 1 TABLET BY MOUTH QD 10/2/18   FILIBERTO Hernandez CNP   aspirin 81 MG EC tablet Take 1 tablet by mouth daily.  3/18/13   Selma Keane MD       Current medications:    Current Facility-Administered Medications   Medication Dose Route Frequency Provider Last Rate Last Dose    ceFAZolin (ANCEF) 2 g in dextrose 4 % 100 mL IVPB (premix)  2 g Intravenous On Call to [facility] Rodney Apple MD        0.9 % sodium chloride infusion   Intravenous Continuous Stacey Casillas MD        sodium chloride flush 0.9 % injection 10 mL  10 mL Intravenous 2 times per day Stacey Casillas MD        sodium chloride flush 0.9 % injection 10 mL  10 mL Intravenous PRN Stacey Casillas MD        HYDROmorphone (DILAUDID) injection 0.25 mg  0.25 mg Intravenous Q5 Min PRN Stacey Casillas MD        fentaNYL (SUBLIMAZE) injection 50 mcg  50 mcg Intravenous Q5 Min PRN Stacey Casillas MD        HYDROmorphone (DILAUDID) injection 0.25 mg  0.25 mg Intravenous Q5 Min PRN Stacey Casillas MD        HYDROmorphone (DILAUDID) injection 0.5 mg 4/5/16    lysis of adhesions       Social History:    Social History     Tobacco Use    Smoking status: Never Smoker    Smokeless tobacco: Never Used   Substance Use Topics    Alcohol use: No     Alcohol/week: 0.0 oz                                Counseling given: Not Answered      Vital Signs (Current):   Vitals:    06/26/19 0941   Weight: 165 lb (74.8 kg)   Height: 5' 9\" (1.753 m)                                              BP Readings from Last 3 Encounters:   06/25/19 121/62   06/23/19 129/68   06/18/19 133/82       NPO Status:                                                                                 BMI:   Wt Readings from Last 3 Encounters:   06/26/19 165 lb (74.8 kg)   06/25/19 179 lb (81.2 kg)   06/23/19 179 lb (81.2 kg)     Body mass index is 24.37 kg/m². CBC:   Lab Results   Component Value Date    WBC 5.1 04/14/2019    RBC 4.81 04/14/2019    HGB 14.7 04/14/2019    HCT 43.9 04/14/2019    MCV 91.2 04/14/2019    RDW 13.7 04/14/2019     04/14/2019       CMP:   Lab Results   Component Value Date     04/14/2019    K 4.3 04/14/2019     04/14/2019    CO2 27 04/14/2019    BUN 22 04/14/2019    CREATININE 0.9 04/14/2019    GFRAA >60 04/14/2019    GFRAA >60 04/24/2013    AGRATIO 1.2 04/14/2019    LABGLOM >60 04/14/2019    GLUCOSE 73 04/14/2019    PROT 7.4 04/14/2019    PROT 8.2 12/31/2012    CALCIUM 9.4 04/14/2019    BILITOT 0.6 04/14/2019    ALKPHOS 78 04/14/2019    AST 23 04/14/2019    ALT 18 04/14/2019       POC Tests: No results for input(s): POCGLU, POCNA, POCK, POCCL, POCBUN, POCHEMO, POCHCT in the last 72 hours.     Coags:   Lab Results   Component Value Date    PROTIME 13.5 04/19/2016    INR 1.18 04/19/2016    APTT 32.9 04/19/2016       HCG (If Applicable): No results found for: PREGTESTUR, PREGSERUM, HCG, HCGQUANT     ABGs:   Lab Results   Component Value Date    PHART 7.354 04/19/2016    PO2ART 89 04/19/2016    OVI6CIT 28 04/19/2016    OME8OEI 15.5 04/19/2016    BEART -10 04/19/2016    A6QQDZEX 97 04/19/2016        Type & Screen (If Applicable):  Lab Results   Component Value Date    LABABO O 06/25/2012    LABRH Positive 06/25/2012       Anesthesia Evaluation    Airway: Mallampati: II  TM distance: >3 FB   Neck ROM: full  Mouth opening: > = 3 FB Dental:          Pulmonary:                              Cardiovascular:    (+) hypertension:, past MI:, CAD:,         Rhythm: regular  Rate: normal                    Neuro/Psych:               GI/Hepatic/Renal:   (+) GERD:,           Endo/Other:                     Abdominal:           Vascular:                                   Conclusions      Summary   -Normal left ventricle size, wall thickness and systolic function with an   estimated ejection fraction of 60%.  -There is abnormal septal motion.   -There is reversal of E/A inflow velocities across the mitral valve   suggesting impaired left ventricular relaxation.   -There is trivial tricuspid regurgitation with RVSP estimated at 34 mmHg.      Signature      ------------------------------------------------------------------   Electronically signed by Jakub Cobos MD, [facility] (Interpreting   physician) on 04/25/2016 at 11:56 AM   ------------------------------------------------------------------        Anesthesia Plan      general     ASA 3       Induction: intravenous. Anesthetic plan and risks discussed with patient. Plan discussed with CRNA.                 Isac Mcburney, MD   6/27/2019

## 2019-06-27 NOTE — PROGRESS NOTES
Pt arrived from OR to PACU bay 4. Report received from OR staff. Pt arouses easily to voice. Surgical dressing in place to left ankle. Pt on 4 L NC, NSR, VSS. Will continue to monitor.

## 2019-07-09 ENCOUNTER — TELEPHONE (OUTPATIENT)
Dept: ORTHOPEDIC SURGERY | Age: 84
End: 2019-07-09

## 2019-07-09 ENCOUNTER — OFFICE VISIT (OUTPATIENT)
Dept: ORTHOPEDIC SURGERY | Age: 84
End: 2019-07-09
Payer: COMMERCIAL

## 2019-07-09 VITALS — WEIGHT: 179 LBS | HEART RATE: 72 BPM | RESPIRATION RATE: 16 BRPM | HEIGHT: 69 IN | BODY MASS INDEX: 26.51 KG/M2

## 2019-07-09 DIAGNOSIS — S82.62XA CLOSED DISPLACED FRACTURE OF LATERAL MALLEOLUS OF LEFT FIBULA, INITIAL ENCOUNTER: Primary | ICD-10-CM

## 2019-07-09 PROCEDURE — L4361 PNEUMA/VAC WALK BOOT PRE OTS: HCPCS | Performed by: ORTHOPAEDIC SURGERY

## 2019-07-09 PROCEDURE — APPNB30 APP NON BILLABLE TIME 0-30 MINS: Performed by: NURSE PRACTITIONER

## 2019-07-09 PROCEDURE — 99024 POSTOP FOLLOW-UP VISIT: CPT | Performed by: NURSE PRACTITIONER

## 2019-07-09 NOTE — PROGRESS NOTES
patient was educated and fit by a healthcare professional with expert knowledge and specialization in brace application while under the direct supervision of the physician. Verbal and written instructions for the use of and application of this item were provided. They were instructed to contact the office immediately should the brace result in increased pain, decreased sensation, increased swelling or worsening of the condition.      Chandler Loo, FILIBERTO - CNP

## 2019-07-09 NOTE — LETTER
[facility]  [address] [facility]  [address] [address]  Phone: [phone]  Fax: [phone]    Kenn Mohan MD        July 9, 2019     Patient: Summer Lizama   YOB: 1935   Date of Visit: 7/9/2019       To Whom It May Concern: It is my medical opinion that Franklin Larios 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 on 7/26/2019 with the following restrictions: with a sit down job only for the next 2 weeks. .    If you have any questions or concerns, please don't hesitate to call.     Sincerely,          Kenn Mohan MD

## 2019-07-15 ENCOUNTER — TELEPHONE (OUTPATIENT)
Dept: NEUROLOGY | Age: 84
End: 2019-07-15

## 2019-07-15 NOTE — TELEPHONE ENCOUNTER
Pt called for test results and also states he is still getting dizzy when he stands up and he fell 6/22 and broke his ankle. Per Dr Mercedes Albrecht lab work is fine and just remind him to use his cane.   Called pt

## 2019-08-22 ENCOUNTER — OFFICE VISIT (OUTPATIENT)
Dept: ORTHOPEDIC SURGERY | Age: 84
End: 2019-08-22
Payer: COMMERCIAL

## 2019-08-22 VITALS — BODY MASS INDEX: 26.51 KG/M2 | HEIGHT: 69 IN | RESPIRATION RATE: 16 BRPM | HEART RATE: 64 BPM | WEIGHT: 179.01 LBS

## 2019-08-22 DIAGNOSIS — S82.62XA CLOSED DISPLACED FRACTURE OF LATERAL MALLEOLUS OF LEFT FIBULA, INITIAL ENCOUNTER: Primary | ICD-10-CM

## 2019-08-22 PROCEDURE — 99024 POSTOP FOLLOW-UP VISIT: CPT | Performed by: NURSE PRACTITIONER

## 2019-08-22 PROCEDURE — APPNB15 APP NON BILLABLE TIME 0-15 MINS: Performed by: NURSE PRACTITIONER

## 2019-08-26 RX ORDER — PRAVASTATIN SODIUM 20 MG
TABLET ORAL
Qty: 90 TABLET | Refills: 3 | Status: SHIPPED | OUTPATIENT
Start: 2019-08-26 | End: 2020-08-24

## 2019-10-03 ENCOUNTER — OFFICE VISIT (OUTPATIENT)
Dept: ORTHOPEDIC SURGERY | Age: 84
End: 2019-10-03
Payer: COMMERCIAL

## 2019-10-03 VITALS — WEIGHT: 179 LBS | RESPIRATION RATE: 16 BRPM | HEIGHT: 69 IN | BODY MASS INDEX: 26.51 KG/M2

## 2019-10-03 DIAGNOSIS — S82.62XA CLOSED DISPLACED FRACTURE OF LATERAL MALLEOLUS OF LEFT FIBULA, INITIAL ENCOUNTER: Primary | ICD-10-CM

## 2019-10-03 PROCEDURE — 99213 OFFICE O/P EST LOW 20 MIN: CPT | Performed by: ORTHOPAEDIC SURGERY

## 2019-10-23 ENCOUNTER — OFFICE VISIT (OUTPATIENT)
Dept: CARDIOLOGY CLINIC | Age: 84
End: 2019-10-23
Payer: MEDICARE

## 2019-10-23 VITALS
DIASTOLIC BLOOD PRESSURE: 60 MMHG | SYSTOLIC BLOOD PRESSURE: 130 MMHG | WEIGHT: 174.3 LBS | BODY MASS INDEX: 25.82 KG/M2 | HEART RATE: 80 BPM | HEIGHT: 69 IN

## 2019-10-23 DIAGNOSIS — E78.2 MIXED HYPERLIPIDEMIA: ICD-10-CM

## 2019-10-23 DIAGNOSIS — I65.23 BILATERAL CAROTID ARTERY STENOSIS: ICD-10-CM

## 2019-10-23 DIAGNOSIS — I10 ESSENTIAL HYPERTENSION: ICD-10-CM

## 2019-10-23 DIAGNOSIS — I25.10 CORONARY ARTERY DISEASE INVOLVING NATIVE HEART WITHOUT ANGINA PECTORIS, UNSPECIFIED VESSEL OR LESION TYPE: Primary | ICD-10-CM

## 2019-10-23 PROCEDURE — 99214 OFFICE O/P EST MOD 30 MIN: CPT | Performed by: INTERNAL MEDICINE

## 2019-10-23 RX ORDER — PRAVASTATIN SODIUM 20 MG
20 TABLET ORAL DAILY
Qty: 90 TABLET | Refills: 3 | Status: CANCELLED | OUTPATIENT
Start: 2019-10-23

## 2020-01-07 ENCOUNTER — OFFICE VISIT (OUTPATIENT)
Dept: ORTHOPEDIC SURGERY | Age: 85
End: 2020-01-07
Payer: COMMERCIAL

## 2020-01-07 VITALS — RESPIRATION RATE: 16 BRPM | HEART RATE: 60 BPM | BODY MASS INDEX: 25.77 KG/M2 | WEIGHT: 174 LBS | HEIGHT: 69 IN

## 2020-01-07 PROCEDURE — 99213 OFFICE O/P EST LOW 20 MIN: CPT | Performed by: ORTHOPAEDIC SURGERY

## 2020-01-07 NOTE — PROGRESS NOTES
DIAGNOSIS:  Left ankle lateral malleolus displaced fracture, status post ORIF. DATE OF SURGERY: 6/27/2019, Worker's Comp. HISTORY OF PRESENT ILLNESS:  Kaye Faust 82 y.o.  male who came in today for 6 months postoperative visit. The patient denies any significant pain in the left ankle. Rates pain a 0/10 with rest, but can increase to 3/10 VAS if he starts doing to much walking. Pain is mild, aching, intermittent and are improving. Aggravating factors walking. Alleviating factors elevation and rest. He reports that he has been noncompliant at times and is put weight on his foot. No numbness or tingling sensation. No fever or Chills. 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 He refused PT and is been doing exercises on his own. He reported today that his balance is off and he would like to exercise more.     Past Medical History:   Diagnosis Date    Arthritis     CAD (coronary artery disease)     GERD (gastroesophageal reflux disease)     Hyperlipidemia     [facility] [address]     Myocardial infarct ([facility] [address]     40 years ago       Past Surgical History:   Procedure Laterality Date    ABDOMEN SURGERY  2013    bowel blockage    ABDOMINAL EXPLORATION SURGERY  1/3/13    ANKLE FRACTURE SURGERY Left 6/27/2019    LEFT LATERAL MALLEOLUS ANKLE OPEN REDUCTION INTERNAL FIXATION -Benna Comp performed by Debora Floyd MD at [address] [place] [address]----- 7/26/12    HERNIA REPAIR  4/5/2016    ventral    PROSTATE SURGERY      VEIN BYPASS SURGERY      VENTRAL HERNIA REPAIR Right 4/5/16    lysis of adhesions       Social History     Socioeconomic History    Marital status:      Spouse name: Not on file    Number of children: Not on file    Years of education: Not on file    Highest education level: Not on file   Occupational History    Not on file   Social Needs    Financial resource strain: Not on file    Food insecurity: Worry: Not on file     Inability: Not on file    Transportation needs:     Medical: Not on file     Non-medical: Not on file   Tobacco Use    Smoking status: Never Smoker    Smokeless tobacco: Never Used   Substance and Sexual Activity    Alcohol use: No     Alcohol/week: 0.0 standard drinks    Drug use: No    Sexual activity: Not Currently   Lifestyle    Physical activity:     Days per week: Not on file     Minutes per session: Not on file    Stress: Not on file   Relationships    Social connections:     Talks on phone: Not on file     Gets together: Not on file     Attends Rastafari service: Not on file     Active member of club or organization: Not on file     Attends meetings of clubs or organizations: Not on file     Relationship status: Not on file    Intimate partner violence:     Fear of current or ex partner: Not on file     Emotionally abused: Not on file     Physically abused: Not on file     Forced sexual activity: Not on file   Other Topics Concern    Not on file   Social History Narrative    Not on file       Family History   Problem Relation Age of Onset    Diabetes Father     Heart Disease Father     Heart Attack Father     Heart Disease Mother     Heart Attack Mother     Anesth Problems Neg Hx     Malig Hyperten Neg Hx     Hypotension Neg Hx     Malig Hypertherm Neg Hx     Pseudochol. Deficiency Neg Hx        Current Outpatient Medications on File Prior to Visit   Medication Sig Dispense Refill    metoprolol tartrate (LOPRESSOR) 25 MG tablet TAKE A HALF TABLET BY MOUTH TWO TIMES A DAY 90 tablet 2    pravastatin (PRAVACHOL) 20 MG tablet TAKE ONE TABLET BY MOUTH DAILY 90 tablet 3    aspirin 81 MG EC tablet Take 1 tablet by mouth daily. 30 tablet 6     No current facility-administered medications on file prior to visit.         Pertinent items are noted in HPI  Review of systems reviewed from Patient History Form dated on 6/25/2019 and available in the patient's chart under the

## 2020-03-13 ENCOUNTER — APPOINTMENT (OUTPATIENT)
Dept: GENERAL RADIOLOGY | Age: 85
End: 2020-03-13
Payer: MEDICARE

## 2020-03-13 ENCOUNTER — APPOINTMENT (OUTPATIENT)
Dept: CT IMAGING | Age: 85
End: 2020-03-13
Payer: MEDICARE

## 2020-03-13 ENCOUNTER — HOSPITAL ENCOUNTER (OUTPATIENT)
Age: 85
Setting detail: OBSERVATION
Discharge: HOME | End: 2020-03-14
Attending: EMERGENCY MEDICINE | Admitting: INTERNAL MEDICINE
Payer: MEDICARE

## 2020-03-13 PROBLEM — S01.91XA LACERATION OF HEAD: Status: ACTIVE | Noted: 2020-03-13

## 2020-03-13 PROBLEM — I95.9 HYPOTENSION: Status: ACTIVE | Noted: 2020-03-13

## 2020-03-13 LAB
A/G RATIO: 1.3 (ref 1.1–2.2)
ALBUMIN SERPL-MCNC: 4 G/DL (ref 3.4–5)
ALP BLD-CCNC: 75 U/L (ref 40–129)
ALT SERPL-CCNC: 21 U/L (ref 10–40)
ANION GAP SERPL CALCULATED.3IONS-SCNC: 10 MMOL/L (ref 3–16)
AST SERPL-CCNC: 31 U/L (ref 15–37)
BASOPHILS ABSOLUTE: 0 K/UL (ref 0–0.2)
BASOPHILS RELATIVE PERCENT: 0.4 %
BILIRUB SERPL-MCNC: 0.6 MG/DL (ref 0–1)
BUN BLDV-MCNC: 21 MG/DL (ref 7–20)
CALCIUM SERPL-MCNC: 9.8 MG/DL (ref 8.3–10.6)
CHLORIDE BLD-SCNC: 101 MMOL/L (ref 99–110)
CO2: 27 MMOL/L (ref 21–32)
CREAT SERPL-MCNC: 0.7 MG/DL (ref 0.8–1.3)
EOSINOPHILS ABSOLUTE: 0.1 K/UL (ref 0–0.6)
EOSINOPHILS RELATIVE PERCENT: 1.4 %
GFR AFRICAN AMERICAN: >60
GFR NON-AFRICAN AMERICAN: >60
GLOBULIN: 3.1 G/DL
GLUCOSE BLD-MCNC: 104 MG/DL (ref 70–99)
HCT VFR BLD CALC: 41.8 % (ref 40.5–52.5)
HEMOGLOBIN: 14 G/DL (ref 13.5–17.5)
LYMPHOCYTES ABSOLUTE: 2 K/UL (ref 1–5.1)
LYMPHOCYTES RELATIVE PERCENT: 33.7 %
MAGNESIUM: 2.1 MG/DL (ref 1.8–2.4)
MCH RBC QN AUTO: 30.7 PG (ref 26–34)
MCHC RBC AUTO-ENTMCNC: 33.5 G/DL (ref 31–36)
MCV RBC AUTO: 91.7 FL (ref 80–100)
MONOCYTES ABSOLUTE: 0.6 K/UL (ref 0–1.3)
MONOCYTES RELATIVE PERCENT: 9.5 %
NEUTROPHILS ABSOLUTE: 3.2 K/UL (ref 1.7–7.7)
NEUTROPHILS RELATIVE PERCENT: 55 %
PDW BLD-RTO: 13.8 % (ref 12.4–15.4)
PLATELET # BLD: 192 K/UL (ref 135–450)
PMV BLD AUTO: 8.2 FL (ref 5–10.5)
POTASSIUM SERPL-SCNC: 4.6 MMOL/L (ref 3.5–5.1)
RBC # BLD: 4.56 M/UL (ref 4.2–5.9)
SODIUM BLD-SCNC: 138 MMOL/L (ref 136–145)
TOTAL PROTEIN: 7.1 G/DL (ref 6.4–8.2)
TROPONIN: <0.01 NG/ML
TROPONIN: <0.01 NG/ML
WBC # BLD: 5.9 K/UL (ref 4–11)

## 2020-03-13 PROCEDURE — 6370000000 HC RX 637 (ALT 250 FOR IP): Performed by: INTERNAL MEDICINE

## 2020-03-13 PROCEDURE — 36415 COLL VENOUS BLD VENIPUNCTURE: CPT

## 2020-03-13 PROCEDURE — 93005 ELECTROCARDIOGRAM TRACING: CPT | Performed by: EMERGENCY MEDICINE

## 2020-03-13 PROCEDURE — 71045 X-RAY EXAM CHEST 1 VIEW: CPT

## 2020-03-13 PROCEDURE — 72125 CT NECK SPINE W/O DYE: CPT

## 2020-03-13 PROCEDURE — 2580000003 HC RX 258: Performed by: INTERNAL MEDICINE

## 2020-03-13 PROCEDURE — 82607 VITAMIN B-12: CPT

## 2020-03-13 PROCEDURE — G0378 HOSPITAL OBSERVATION PER HR: HCPCS

## 2020-03-13 PROCEDURE — 12002 RPR S/N/AX/GEN/TRNK2.6-7.5CM: CPT

## 2020-03-13 PROCEDURE — 84443 ASSAY THYROID STIM HORMONE: CPT

## 2020-03-13 PROCEDURE — 83735 ASSAY OF MAGNESIUM: CPT

## 2020-03-13 PROCEDURE — 83036 HEMOGLOBIN GLYCOSYLATED A1C: CPT

## 2020-03-13 PROCEDURE — 82746 ASSAY OF FOLIC ACID SERUM: CPT

## 2020-03-13 PROCEDURE — 85025 COMPLETE CBC W/AUTO DIFF WBC: CPT

## 2020-03-13 PROCEDURE — 70450 CT HEAD/BRAIN W/O DYE: CPT

## 2020-03-13 PROCEDURE — 96372 THER/PROPH/DIAG INJ SC/IM: CPT

## 2020-03-13 PROCEDURE — 80053 COMPREHEN METABOLIC PANEL: CPT

## 2020-03-13 PROCEDURE — 99285 EMERGENCY DEPT VISIT HI MDM: CPT

## 2020-03-13 PROCEDURE — 94760 N-INVAS EAR/PLS OXIMETRY 1: CPT

## 2020-03-13 PROCEDURE — 6360000002 HC RX W HCPCS: Performed by: INTERNAL MEDICINE

## 2020-03-13 PROCEDURE — 84484 ASSAY OF TROPONIN QUANT: CPT

## 2020-03-13 RX ORDER — ACETAMINOPHEN 650 MG/1
650 SUPPOSITORY RECTAL EVERY 6 HOURS PRN
Status: DISCONTINUED | OUTPATIENT
Start: 2020-03-13 | End: 2020-03-14 | Stop reason: HOSPADM

## 2020-03-13 RX ORDER — POLYETHYLENE GLYCOL 3350 17 G/17G
17 POWDER, FOR SOLUTION ORAL DAILY PRN
Status: DISCONTINUED | OUTPATIENT
Start: 2020-03-13 | End: 2020-03-14 | Stop reason: HOSPADM

## 2020-03-13 RX ORDER — ASPIRIN 81 MG/1
81 TABLET ORAL DAILY
Status: DISCONTINUED | OUTPATIENT
Start: 2020-03-14 | End: 2020-03-14 | Stop reason: HOSPADM

## 2020-03-13 RX ORDER — ONDANSETRON 2 MG/ML
4 INJECTION INTRAMUSCULAR; INTRAVENOUS EVERY 6 HOURS PRN
Status: DISCONTINUED | OUTPATIENT
Start: 2020-03-13 | End: 2020-03-14 | Stop reason: HOSPADM

## 2020-03-13 RX ORDER — PROMETHAZINE HYDROCHLORIDE 25 MG/1
12.5 TABLET ORAL EVERY 6 HOURS PRN
Status: DISCONTINUED | OUTPATIENT
Start: 2020-03-13 | End: 2020-03-14 | Stop reason: HOSPADM

## 2020-03-13 RX ORDER — LIDOCAINE HYDROCHLORIDE AND EPINEPHRINE 10; 10 MG/ML; UG/ML
20 INJECTION, SOLUTION INFILTRATION; PERINEURAL ONCE
Status: DISCONTINUED | OUTPATIENT
Start: 2020-03-13 | End: 2020-03-14 | Stop reason: HOSPADM

## 2020-03-13 RX ORDER — M-VIT,TX,IRON,MINS/CALC/FOLIC 27MG-0.4MG
1 TABLET ORAL DAILY
COMMUNITY

## 2020-03-13 RX ORDER — SODIUM CHLORIDE 9 MG/ML
INJECTION, SOLUTION INTRAVENOUS CONTINUOUS
Status: DISCONTINUED | OUTPATIENT
Start: 2020-03-13 | End: 2020-03-14 | Stop reason: HOSPADM

## 2020-03-13 RX ORDER — SODIUM CHLORIDE 0.9 % (FLUSH) 0.9 %
10 SYRINGE (ML) INJECTION EVERY 12 HOURS SCHEDULED
Status: DISCONTINUED | OUTPATIENT
Start: 2020-03-13 | End: 2020-03-14 | Stop reason: HOSPADM

## 2020-03-13 RX ORDER — LIDOCAINE HYDROCHLORIDE 10 MG/ML
INJECTION, SOLUTION EPIDURAL; INFILTRATION; INTRACAUDAL; PERINEURAL
Status: DISPENSED
Start: 2020-03-13 | End: 2020-03-14

## 2020-03-13 RX ORDER — SODIUM CHLORIDE 0.9 % (FLUSH) 0.9 %
10 SYRINGE (ML) INJECTION PRN
Status: DISCONTINUED | OUTPATIENT
Start: 2020-03-13 | End: 2020-03-14 | Stop reason: HOSPADM

## 2020-03-13 RX ORDER — M-VIT,TX,IRON,MINS/CALC/FOLIC 27MG-0.4MG
1 TABLET ORAL DAILY
Status: DISCONTINUED | OUTPATIENT
Start: 2020-03-13 | End: 2020-03-14 | Stop reason: HOSPADM

## 2020-03-13 RX ORDER — PRAVASTATIN SODIUM 20 MG
20 TABLET ORAL DAILY
Status: DISCONTINUED | OUTPATIENT
Start: 2020-03-14 | End: 2020-03-14 | Stop reason: HOSPADM

## 2020-03-13 RX ORDER — ACETAMINOPHEN 325 MG/1
650 TABLET ORAL EVERY 6 HOURS PRN
Status: DISCONTINUED | OUTPATIENT
Start: 2020-03-13 | End: 2020-03-14 | Stop reason: HOSPADM

## 2020-03-13 RX ADMIN — Medication 10 ML: at 20:18

## 2020-03-13 RX ADMIN — MULTIPLE VITAMINS W/ MINERALS TAB 1 TABLET: TAB at 20:17

## 2020-03-13 RX ADMIN — SODIUM CHLORIDE: 9 INJECTION, SOLUTION INTRAVENOUS at 20:17

## 2020-03-13 RX ADMIN — ENOXAPARIN SODIUM 40 MG: 40 INJECTION SUBCUTANEOUS at 20:17

## 2020-03-13 ASSESSMENT — PAIN SCALES - GENERAL
PAINLEVEL_OUTOF10: 3
PAINLEVEL_OUTOF10: 0

## 2020-03-13 ASSESSMENT — ENCOUNTER SYMPTOMS
PHOTOPHOBIA: 0
EYE PAIN: 0
WHEEZING: 0
ABDOMINAL DISTENTION: 0
VOMITING: 0
SHORTNESS OF BREATH: 0
EYE REDNESS: 0
CONSTIPATION: 0
BACK PAIN: 0
EYE ITCHING: 0
NAUSEA: 0
DIARRHEA: 0
COLOR CHANGE: 0
EYE DISCHARGE: 0
ABDOMINAL PAIN: 0
COUGH: 0
STRIDOR: 0

## 2020-03-13 NOTE — ED NOTES
Pharmacy Medication History Note      List of current medications patient is taking is complete. Source of information: patient    Changes made to medication list:  Medications flagged for removal (include reason, ex. noncompliance):  N/A    Medications removed (include reason, ex. therapy complete or physician discontinued):  Metoprolol- dose adjustment    Medications added/doses adjusted:  Metoprolol Tartrate 25mg- BID    Other notes (ex. Recent course of antibiotics, Coumadin dosing):  Denies use of other OTC or herbal medications. Last dose times updated. Eduardo Garcia [facility]    No current facility-administered medications on file prior to encounter. Current Outpatient Medications on File Prior to Encounter   Medication Sig Dispense Refill    metoprolol tartrate (LOPRESSOR) 25 MG tablet Take 25 mg by mouth daily      Multiple Vitamins-Minerals (THERAPEUTIC MULTIVITAMIN-MINERALS) tablet Take 1 tablet by mouth daily      pravastatin (PRAVACHOL) 20 MG tablet TAKE ONE TABLET BY MOUTH DAILY 90 tablet 3    aspirin 81 MG EC tablet Take 1 tablet by mouth daily.  30 tablet 6    [DISCONTINUED] metoprolol tartrate (LOPRESSOR) 25 MG tablet TAKE A HALF TABLET BY MOUTH TWO TIMES A DAY 90 tablet 2

## 2020-03-13 NOTE — ED PROVIDER NOTES
[address] [facility]        Pt Name: Marias Velez  MRN: [id number]  [place] 1935  Date of evaluation: 3/13/2020  Provider: Audrey Lopez PA-C  PCP: Ayush Carver MD     I have seen and evaluated this patient with my supervising physician Jared Gonzalez MD.    [address] [place] [address]       Chief Complaint   Patient presents with    Loss of Consciousness     Pt states while standing in his kitchen he became dizzy and passed out, laceration to the posterior head. Pt states hx of same. HISTORY OF PRESENT ILLNESS   (Location, Timing/Onset, Context/Setting, Quality, Duration, Modifying Factors, Severity, Associated Signs and Symptoms)  Note limiting factors. Marisa Velez is a 82 y.o. male with history of coronary artery disease, hyperlipidemia, arthritis who presents to the emergency department complaining of scalp laceration status post syncopal event. The patient was in his kitchen standing and felt lightheaded and passed out. He hit his head on the ground. This has happened to him multiple times in the past.  In fact 2 years ago, he was evaluated extensively by a neurologist and cardiologist.  He had multiple MRIs which were negative. He rates his scalp pain to be a 3-10 on pain scale. He believes his tetanus is up-to-date. Denies any other discomfort at this time. Nursing Notes were all reviewed and agreed with or any disagreements were addressed in the HPI. REVIEW OF SYSTEMS    (2-9 systems for level 4, 10 or more for level 5)     Review of Systems   Constitutional: Negative for chills and fever. HENT: Negative. Eyes: Negative for photophobia, pain, discharge, redness, itching and visual disturbance. Respiratory: Negative for cough, shortness of breath, wheezing and stridor. Cardiovascular: Negative for chest pain, palpitations and leg swelling.    Gastrointestinal: Negative for abdominal distention, abdominal pain, constipation, diarrhea, nausea and vomiting. Endocrine: Negative. Genitourinary: Negative. Musculoskeletal: Negative for back pain, neck pain and neck stiffness. Skin: Positive for wound. Negative for color change, pallor and rash. Neurological: Positive for light-headedness. Negative for dizziness, tremors, seizures, syncope, facial asymmetry, speech difficulty, weakness, numbness and headaches. Psychiatric/Behavioral: Negative for confusion. All other systems reviewed and are negative. Positives and Pertinent negatives as per HPI. Except as noted above in the ROS, all other systems were reviewed and negative. PAST MEDICAL HISTORY     Past Medical History:   Diagnosis Date    Arthritis     CAD (coronary artery disease)     GERD (gastroesophageal reflux disease)     Hyperlipidemia     [facility] [address]     Myocardial infarct ([facility] [address]     40 years ago         SURGICAL HISTORY     Past Surgical History:   Procedure Laterality Date    ABDOMEN SURGERY  2013    bowel blockage    ABDOMINAL EXPLORATION SURGERY  1/3/13    ANKLE FRACTURE SURGERY Left 6/27/2019    LEFT LATERAL MALLEOLUS ANKLE OPEN REDUCTION INTERNAL FIXATION -[facility] performed by Hanna Heard MD at [address] [place] [address]----- 7/26/12    HERNIA REPAIR  4/5/2016    ventral    PROSTATE SURGERY      VEIN BYPASS SURGERY      VENTRAL HERNIA REPAIR Right 4/5/16    lysis of adhesions         CURRENTMEDICATIONS       Previous Medications    ASPIRIN 81 MG EC TABLET    Take 1 tablet by mouth daily. METOPROLOL TARTRATE (LOPRESSOR) 25 MG TABLET    Take 25 mg by mouth daily    MULTIPLE VITAMINS-MINERALS (THERAPEUTIC MULTIVITAMIN-MINERALS) TABLET    Take 1 tablet by mouth daily    PRAVASTATIN (PRAVACHOL) 20 MG TABLET    TAKE ONE TABLET BY MOUTH DAILY         ALLERGIES     Patient has no known allergies.     FAMILYHISTORY       Family History   Problem Relation Age of Onset  Diabetes Father     Heart Disease Father     Heart Attack Father     Heart Disease Mother     Heart Attack Mother     Anesth Problems Neg Hx     Malig Hyperten Neg Hx     Hypotension Neg Hx     Malig Hypertherm Neg Hx     Pseudochol. Deficiency Neg Hx           SOCIAL HISTORY       Social History     Tobacco Use    Smoking status: Never Smoker    Smokeless tobacco: Never Used   Substance Use Topics    Alcohol use: No     Alcohol/week: 0.0 standard drinks    Drug use: No       SCREENINGS             PHYSICAL EXAM    (up to 7 for level 4, 8 or more for level 5)     ED Triage Vitals [03/13/20 1219]   BP Temp Temp Source Pulse Resp SpO2 Height Weight   (!) 147/80 98 °F (36.7 °C) Oral 58 15 96 % 5' 9\" (1.753 m) 170 lb (77.1 kg)       Physical Exam  Vitals signs and nursing note reviewed. Constitutional:       Appearance: Normal appearance. He is well-developed. He is not toxic-appearing or diaphoretic. HENT:      Head: Normocephalic. Contusion and laceration present. No raccoon eyes, Chavez's sign or abrasion. Jaw: There is normal jaw occlusion. Salivary Glands: Right salivary gland is not diffusely enlarged or tender. Left salivary gland is not diffusely enlarged or tender. Right Ear: Hearing, tympanic membrane, ear canal and external ear normal. No hemotympanum. Left Ear: Hearing, tympanic membrane, ear canal and external ear normal. No hemotympanum. Nose: Nose normal.      Right Nostril: No epistaxis. Left Nostril: No epistaxis. Mouth/Throat:      Mouth: Mucous membranes are moist.      Pharynx: Oropharynx is clear. Eyes:      General: No scleral icterus. Right eye: No discharge. Left eye: No discharge. Extraocular Movements: Extraocular movements intact. Conjunctiva/sclera: Conjunctivae normal.      Pupils: Pupils are equal, round, and reactive to light. Neck:      Musculoskeletal: Normal range of motion.    Cardiovascular: Rate and Rhythm: Normal rate. Pulmonary:      Effort: Pulmonary effort is normal.      Breath sounds: Normal breath sounds. Abdominal:      General: Bowel sounds are normal. There is no distension. Palpations: Abdomen is soft. Tenderness: There is no abdominal tenderness. Musculoskeletal: Normal range of motion. Right hip: Normal.      Cervical back: Normal.      Thoracic back: Normal.      Lumbar back: Normal.   Skin:     General: Skin is warm and dry. Capillary Refill: Capillary refill takes less than 2 seconds. Coloration: Skin is not jaundiced or pale. Findings: No bruising, erythema, lesion or rash. Neurological:      General: No focal deficit present. Mental Status: He is alert and oriented to person, place, and time. Cranial Nerves: No cranial nerve deficit. Sensory: No sensory deficit. Motor: No weakness. Coordination: Coordination normal.      Gait: Gait normal.      Deep Tendon Reflexes: Reflexes normal.      Comments: No pronator drift, facial droop or slurred speech. Normal finger to nose coordination. Normal rapid alternating hand movement. Normal heel to shin coordination   Kateirn Hallpike negative without nystagmus. 5 out of 5 strength in all 4 extremities without focal weakness, paresthesia or radiculopathy.    Psychiatric:         Mood and Affect: Mood normal.         Behavior: Behavior normal.         DIAGNOSTIC RESULTS   LABS:    Labs Reviewed   COMPREHENSIVE METABOLIC PANEL - Abnormal; Notable for the following components:       Result Value    Glucose 104 (*)     BUN 21 (*)     CREATININE 0.7 (*)     All other components within normal limits    Narrative:     Performed at:  [facility] [address], [place], [address]   Phone [phone]   CBC WITH AUTO DIFFERENTIAL    Narrative:     Performed at:  [facility] [address], [place], [facility] [employer]   Phone fashion  Exploration:     Hemostasis achieved with:  Direct pressure    Wound exploration: wound explored through full range of motion and entire depth of wound probed and visualized      Wound extent: no foreign bodies/material noted, no muscle damage noted, no nerve damage noted, no tendon damage noted, no underlying fracture noted and no vascular damage noted      Contaminated: no    Treatment:     Area cleansed with:  Saline and Hibiclens    Amount of cleaning:  Extensive    Irrigation solution:  Sterile saline    Irrigation volume:  1 L    Irrigation method:  Tap  Skin repair:     Repair method:  Sutures    Suture size:  6-0    Suture material:  Nylon    Suture technique:  Simple interrupted    Number of sutures:  5  Approximation:     Approximation:  Close  Post-procedure details:     Dressing:  Open (no dressing)    Patient tolerance of procedure: Tolerated well, no immediate complications        CRITICAL CARE TIME   N/A    CONSULTS:  IP CONSULT TO HOSPITALIST  My attending spoke with admitting physician. Please see his note. EMERGENCY DEPARTMENT COURSE and DIFFERENTIAL DIAGNOSIS/MDM:   Vitals:    Vitals:    03/13/20 1219 03/13/20 1330 03/13/20 1345 03/13/20 1400   BP: (!) 147/80 (!) 133/53  127/63   Pulse: 58 56 52 55   Resp: 15 16 19 16   Temp: 98 °F (36.7 °C)      TempSrc: Oral      SpO2: 96% 97% 95% 96%   Weight: 170 lb (77.1 kg)      Height: 5' 9\" (1.753 m)          Patient was given the following medications:  Medications   lidocaine-EPINEPHrine 1 percent-[id number] injection 20 mL (has no administration in time range)   lidocaine PF 1 % injection (has no administration in time range)       This patient presents to the emergency department after syncope and collapse resulting in head trauma. He sustained a laceration to his scalp. He tolerated laceration repair well without immediate complications or emesis. CT head and cervical spine are stable.   Given his risk factors, we do feel admission is

## 2020-03-13 NOTE — H&P
HOSPITALISTS HISTORY AND PHYSICAL    3/13/2020 3:55 PM    Patient Information:  Crista Raymond is a 82 y.o. male [id number]  PCP:  José Antonio Faye MD (Tel: None )    Chief complaint:    Chief Complaint   Patient presents with    Loss of Consciousness     Pt states while standing in his kitchen he became dizzy and passed out, laceration to the posterior head. Pt states hx of same. History of Present Illness:  Jackson Mcgill is a 82 y.o. male with history of CAD, syncope followed by Dr Duane Nicolas, HTN, atypical atrial flutter, spinocerebellar ataxia followed by Dr Vania Chowdary who came to ER with syncope and collapse with head trauma. Patient was standing up to go from living room to kitchen to get coffee and had sudden syncope. Patient states he has been getting dizzy with standing for years, but getting worse recently. No CP, SOB, HA or fevers. No cough, dysphagia or odynophagia. No numbness, weakness or tingling. No urine or fecal incontinence. Had head laceration repaired by midlevel in ED. Uses cane to ambulate. Lives with wife. Otherwise complete ROS is negative unless listed above. REVIEW OF SYSTEMS:   Pertinent positives as noted in HPI. All other systems were reviewed and are negative. Past Medical History:   has a past medical history of Arthritis, CAD (coronary artery disease), GERD (gastroesophageal reflux disease), Hyperlipidemia, MI (myocardial infarction) ([facility] [address] and Myocardial infarct ([facility] [address] Past Surgical History:   has a past surgical history that includes Vein bypass surgery; Cardiac surgery; Abdominal exploration surgery (1/3/13); ventral hernia repair (Right, 4/5/16); hernia repair (4/5/2016); Abdomen surgery (2013); Prostate surgery; and Ankle fracture surgery (Left, 6/27/2019).      Medications:  No current facility-administered medications on file prior to 192 03/13/2020    MPV 8.2 03/13/2020     BMP:    Lab Results   Component Value Date     03/13/2020    K 4.6 03/13/2020    K 4.3 04/14/2019     03/13/2020    CO2 27 03/13/2020    BUN 21 03/13/2020    CREATININE 0.7 03/13/2020    CALCIUM 9.8 03/13/2020    GFRAA >60 03/13/2020    GFRAA >60 04/24/2013    LABGLOM >60 03/13/2020    GLUCOSE 104 03/13/2020     CT CERVICAL SPINE WO CONTRAST   Final Result   No acute abnormality of the cervical spine. CT HEAD WO CONTRAST   Final Result   No acute intracranial abnormality. Age related changes including chronic small vessel ischemic disease and   cerebral atrophy. XR CHEST PORTABLE   Final Result   No evidence of acute cardiopulmonary disease. Problem List  Principal Problem:    Syncope and collapse  Active Problems:    CAD (coronary artery disease)    HTN (hypertension)    Carotid artery disease (HCC)    Atypical atrial flutter (HCC)    Escobedo-Hallman syncope    Spinocerebellar ataxia (HCC)    Laceration of head    Hypotension  Resolved Problems:    * No resolved hospital problems. *        Assessment/Plan:   1. Place in observation  2. Hold Metoprolol given hypotension  3. Check orthostatics  4. Cardio consult for syncope  5. Serial troponin  6. Check TSH, B12, FA, HgA1C  7. Consider Midodrine if remains hypotensive  8. Check UA to r/o UTI  9. IVF  10. PT/OT/SLP eval      DVT prophylaxis Lovenox  Code status Full code  Diet Cardiac   IV access Peripheral  Whipple Catheter No     Place in Observation. I anticipate hospitalization spanning less than two midnights for investigation and treatment of the above medically necessary diagnoses. Discussed with patient, Dr Bill Price (ED) and ED RN. Home tomorrow if ok with Cardio.     Raoul Echeverria MD    3/13/2020 3:55 PM

## 2020-03-13 NOTE — ED PROVIDER NOTES
2019 HISTORY: ORDERING SYSTEM PROVIDED HISTORY: head injury TECHNOLOGIST PROVIDED HISTORY: Reason for exam:->head injury Has a \"code stroke\" or \"stroke alert\" been called? ->No Reason for Exam: head injury Acuity: Acute Type of Exam: Initial Pain. FINDINGS: BRAIN/VENTRICLES: There is no acute intracranial hemorrhage, mass effect or midline shift. No abnormal extra-axial fluid collection. The gray-white differentiation is maintained without evidence of an acute infarct. There is no evidence of hydrocephalus. Moderate periventricular and deep subcortical white matter hypodensity is present. Diffuse atrophy. ORBITS: The visualized portion of the orbits demonstrate no acute abnormality. SINUSES: Mucous retention cyst versus polyp is seen in the right maxillary sinus. The remaining paranasal sinuses and mastoid air cells are clear. SOFT TISSUES/SKULL:  No acute abnormality of the visualized skull or soft tissues. No acute intracranial abnormality. Age related changes including chronic small vessel ischemic disease and cerebral atrophy. Ct Cervical Spine Wo Contrast    Result Date: 3/13/2020  EXAMINATION: CT OF THE CERVICAL SPINE WITHOUT CONTRAST 3/13/2020 1:08 pm TECHNIQUE: CT of the cervical spine was performed without the administration of intravenous contrast. Multiplanar reformatted images are provided for review. Dose modulation, iterative reconstruction, and/or weight based adjustment of the mA/kV was utilized to reduce the radiation dose to as low as reasonably achievable. COMPARISON: None. HISTORY: ORDERING SYSTEM PROVIDED HISTORY: fall/syncope TECHNOLOGIST PROVIDED HISTORY: Reason for exam:->fall/syncope Reason for Exam: head injury Acuity: Acute Type of Exam: Initial Neck pain. FINDINGS: BONES/ALIGNMENT: There is no acute fracture or traumatic malalignment. DEGENERATIVE CHANGES: Mild-to-moderate multilevel degenerative disc disease with endplate spurring is seen. Mild facet arthropathy.  SOFT

## 2020-03-13 NOTE — CARE COORDINATION
Discharge Planning Assessment    SW discharge planner met with patient to discuss reason for admission, current living situation, and potential needs at the time of discharge. Pt in ED d/t syncope and collapse    Demographics/Insurance verified:  Yes    Current [address] of dwelling[address] - pt stated normally ok w/stairs    Living arrangements:  w/spouse    Level of function/Support:  Pt reports he uses a cane sometimes. Reports he is still independent with all other ADLs and still works a night shift. Stated spouse is supportive. PCP:  Dr. David New. Stated Dr. Bard Peabody retired. Last Visit to PCP:  A year ago    DME:  Cane. No other DME needs reported. Active with any community resources/agencies/skilled home care:  None. No non-skilled needs reported. Medication compliance issues:  No    Financial issues that could impact healthcare:  No    Tentative discharge plan:  Home most likely. PT/OT pending but pt does not anticipate any home needs. Discussed with patient and/or family that on the day of discharge home tentative time of discharge will be between 10 AM and noon. Transportation at the time of discharge:  Pt drives. But spouse can assist home.     Electronically signed by HUMAIRA Waller, ANDREWW on 3/13/2020 at 4:58 PM

## 2020-03-14 VITALS
HEART RATE: 71 BPM | WEIGHT: 167 LBS | RESPIRATION RATE: 16 BRPM | SYSTOLIC BLOOD PRESSURE: 112 MMHG | DIASTOLIC BLOOD PRESSURE: 65 MMHG | BODY MASS INDEX: 24.73 KG/M2 | TEMPERATURE: 97.6 F | OXYGEN SATURATION: 95 % | HEIGHT: 69 IN

## 2020-03-14 LAB
ANION GAP SERPL CALCULATED.3IONS-SCNC: 9 MMOL/L (ref 3–16)
BASOPHILS ABSOLUTE: 0 K/UL (ref 0–0.2)
BASOPHILS RELATIVE PERCENT: 0.3 %
BUN BLDV-MCNC: 18 MG/DL (ref 7–20)
CALCIUM SERPL-MCNC: 9.4 MG/DL (ref 8.3–10.6)
CHLORIDE BLD-SCNC: 103 MMOL/L (ref 99–110)
CO2: 27 MMOL/L (ref 21–32)
CREAT SERPL-MCNC: 0.8 MG/DL (ref 0.8–1.3)
EKG ATRIAL RATE: 61 BPM
EKG DIAGNOSIS: NORMAL
EKG P AXIS: 41 DEGREES
EKG P-R INTERVAL: 216 MS
EKG Q-T INTERVAL: 438 MS
EKG QRS DURATION: 120 MS
EKG QTC CALCULATION (BAZETT): 440 MS
EKG R AXIS: -36 DEGREES
EKG T AXIS: 18 DEGREES
EKG VENTRICULAR RATE: 61 BPM
EOSINOPHILS ABSOLUTE: 0.1 K/UL (ref 0–0.6)
EOSINOPHILS RELATIVE PERCENT: 2 %
ESTIMATED AVERAGE GLUCOSE: 114 MG/DL
FOLATE: >20 NG/ML (ref 4.78–24.2)
GFR AFRICAN AMERICAN: >60
GFR NON-AFRICAN AMERICAN: >60
GLUCOSE BLD-MCNC: 94 MG/DL (ref 70–99)
HBA1C MFR BLD: 5.6 %
HCT VFR BLD CALC: 41 % (ref 40.5–52.5)
HEMOGLOBIN: 13.9 G/DL (ref 13.5–17.5)
LYMPHOCYTES ABSOLUTE: 2.6 K/UL (ref 1–5.1)
LYMPHOCYTES RELATIVE PERCENT: 40.5 %
MCH RBC QN AUTO: 30.4 PG (ref 26–34)
MCHC RBC AUTO-ENTMCNC: 33.9 G/DL (ref 31–36)
MCV RBC AUTO: 89.6 FL (ref 80–100)
MONOCYTES ABSOLUTE: 0.6 K/UL (ref 0–1.3)
MONOCYTES RELATIVE PERCENT: 9.7 %
NEUTROPHILS ABSOLUTE: 3 K/UL (ref 1.7–7.7)
NEUTROPHILS RELATIVE PERCENT: 47.5 %
PDW BLD-RTO: 14 % (ref 12.4–15.4)
PLATELET # BLD: 200 K/UL (ref 135–450)
PMV BLD AUTO: 8.1 FL (ref 5–10.5)
POTASSIUM REFLEX MAGNESIUM: 4.7 MMOL/L (ref 3.5–5.1)
RBC # BLD: 4.58 M/UL (ref 4.2–5.9)
SODIUM BLD-SCNC: 139 MMOL/L (ref 136–145)
TROPONIN: <0.01 NG/ML
TROPONIN: <0.01 NG/ML
TSH REFLEX: 2.49 UIU/ML (ref 0.27–4.2)
VITAMIN B-12: 753 PG/ML (ref 211–911)
WBC # BLD: 6.3 K/UL (ref 4–11)

## 2020-03-14 PROCEDURE — 97161 PT EVAL LOW COMPLEX 20 MIN: CPT

## 2020-03-14 PROCEDURE — 36415 COLL VENOUS BLD VENIPUNCTURE: CPT

## 2020-03-14 PROCEDURE — G0378 HOSPITAL OBSERVATION PER HR: HCPCS

## 2020-03-14 PROCEDURE — 84484 ASSAY OF TROPONIN QUANT: CPT

## 2020-03-14 PROCEDURE — 92526 ORAL FUNCTION THERAPY: CPT

## 2020-03-14 PROCEDURE — 85025 COMPLETE CBC W/AUTO DIFF WBC: CPT

## 2020-03-14 PROCEDURE — 99215 OFFICE O/P EST HI 40 MIN: CPT | Performed by: INTERNAL MEDICINE

## 2020-03-14 PROCEDURE — 6370000000 HC RX 637 (ALT 250 FOR IP): Performed by: INTERNAL MEDICINE

## 2020-03-14 PROCEDURE — 93010 ELECTROCARDIOGRAM REPORT: CPT | Performed by: INTERNAL MEDICINE

## 2020-03-14 PROCEDURE — 2580000003 HC RX 258: Performed by: INTERNAL MEDICINE

## 2020-03-14 PROCEDURE — 97530 THERAPEUTIC ACTIVITIES: CPT

## 2020-03-14 PROCEDURE — 92610 EVALUATE SWALLOWING FUNCTION: CPT

## 2020-03-14 PROCEDURE — 80048 BASIC METABOLIC PNL TOTAL CA: CPT

## 2020-03-14 RX ADMIN — Medication 10 ML: at 11:29

## 2020-03-14 RX ADMIN — SODIUM CHLORIDE: 9 INJECTION, SOLUTION INTRAVENOUS at 11:42

## 2020-03-14 RX ADMIN — PRAVASTATIN SODIUM 20 MG: 20 TABLET ORAL at 11:28

## 2020-03-14 RX ADMIN — MULTIPLE VITAMINS W/ MINERALS TAB 1 TABLET: TAB at 11:28

## 2020-03-14 RX ADMIN — ASPIRIN 81 MG: 81 TABLET, COATED ORAL at 11:28

## 2020-03-14 ASSESSMENT — PAIN SCALES - GENERAL: PAINLEVEL_OUTOF10: 0

## 2020-03-14 NOTE — PROGRESS NOTES
Called ed and rn is in room in full isolation will await return call
Discharge instruction reviewed with pt. Pt allowed to ask questions and verbalize understanding. copy of discharge instruction given. Wife picked patient up in [place] at front door. Discharge complete.
Introduced self to patient. Initial shift assessment completed, see complex assessment in doc flowsheet. Questions answered. Call light within reach. Bed in low position with wheels locked. Encouraged to call for nurse as needed throughout the shift.
Shift assessment completed. Pt is A/Ox4 with no signs of distress noted. Respirations are even and unlabored. Vitals are stable. No c/o dizziness/CP. Fall precautions in place. Iv fluids infusing. Will continue to monitor pt.
stasis    Pharyngeal Phase:  Minimally Decreased laryngeal elevation via palpation     Patient/Family Education:Education, results and recommendations given to the Pt and nurse, who verbalized understanding    Timed Code Treatment: 0 minutes     Total Treatment Time: 30 minutes     Discharge Recommendations: Speech Therapy for Speech/Dysphagia treatment at discharge. If patient discharges prior to next session this note will serve as a discharge summary. Vincent BOURNE CCC-SLP  Speech-Language Pathologist   3/14/2020 10:25 AM

## 2020-03-14 NOTE — CONSULTS
[address]  Inpatient Consult/H+P  Interventional Cardiology/Structural Heart Disease    REASON FOR CONSULT/CHIEF COMPLAINT/HPI     RFC/CC syncope   HPI Dustin Blocker is a 82 y. o.with hx CAD s/p CABG 6/12 presents with syncope. While walking in house reports [address] Reports dizziness for some time but worsening lately. Denies cp, sob. Normotensive on admission with HR 50s. EKG with HR 61 1st AVB, RBBB. Feels great today and requesting discharge. EKG TROP pBNP CXR CTC TELE   SR, 1stAVB, RBBB neg na nad na nsr     HISTORY/ALLERGIES/ROS     MedHx:   has a past medical history of Arthritis, CAD (coronary artery disease), GERD (gastroesophageal reflux disease), Hyperlipidemia, MI (myocardial infarction) ([facility] [address] and Myocardial infarct ([facility] [address] SurgHx:  has a past surgical history that includes Vein bypass surgery; Cardiac surgery; Abdominal exploration surgery (1/3/13); ventral hernia repair (Right, 4/5/16); hernia repair (4/5/2016); Abdomen surgery (2013); Prostate surgery; and Ankle fracture surgery (Left, 6/27/2019). SocHx:   reports that he has never smoked. He has never used smokeless tobacco. He reports that he does not drink alcohol or use drugs. Famx:  [unfilled]  Allergies: Patient has no known allergies. ROS:   [x]Full ROS obtained and negative except as mentioned in HPI    MEDICATIONS      Prior to Admission medications    Medication Sig Start Date End Date Taking? Authorizing Provider   metoprolol tartrate (LOPRESSOR) 25 MG tablet Take 25 mg by mouth daily   Yes Historical Provider, MD   Multiple Vitamins-Minerals (THERAPEUTIC MULTIVITAMIN-MINERALS) tablet Take 1 tablet by mouth daily   Yes Historical Provider, MD   pravastatin (PRAVACHOL) 20 MG tablet TAKE ONE TABLET BY MOUTH DAILY 8/26/19  Yes FILIBERTO Hill - CNP   aspirin 81 MG EC tablet Take 1 tablet by mouth daily.  3/18/13  Yes Taylor Troy MD       PHYSICAL EXAM        Vitals:    03/14/20 1130   BP: 112/65   Pulse: 71   Resp: 16   Temp: 97.6 °F (36.4 °C)   SpO2:     Weight: 167 lb (75.8 kg)     Gen Alert, coop, no distress Heart  Rrr, no mrg   Head NC, AT, no abnorm Abd  Soft, NT, +BS, no mass, no OM   Eyes PERRLA, conj/corn clear Ext  Ext nl, AT, no C/C/E   Nose Nares nl, no drain, NT Pulse 2+ and symmetric   Throat Lips, mucosa, tongue nl Skin Color/text/turg nl, no rash/lesions   Neck S/S, TM, NT, no bruit/JVD Psych Nl mood and affect   Lung CTA-B, unlabored, no DTP Lymph   No cervical or axillary LA   Ch wall NT, no deform Neuro  Nl gross M/S exam     LABS     Reviewed relevant and available CV data    ASSESSMENT AND PLAN     ~CAD  TTE 4/16 60%  MPI 4/16 68%, no ischemia  Plan No evidence for ACS - no cp, sob, ekg without acute changes, troponin negative   Report to office Monday for cardionet   Stop BB   Patient wishes to be discharged, ok if follows up acutely as mentioned  ~HTN  BP controlled  Plan Stop bb with syncope, 1st AVB and RBBB  ~CHOL  On statin  Plan Continue statin  ~Syncope  Etiology unclear   Plan Stop metoprolol   Cardionet for 30d, come to office Monday morning   Hydration, compression, behavioral discussed  ~Work  Works at desk job, 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

## 2020-03-14 NOTE — DISCHARGE INSTR - COC
Continuity of Care Form    Patient Name: Ml Patiño   :  1935  MRN:  [id number]    Admit date:  3/13/2020  Discharge date: 3/14/2020    Code Status Order: Full Code   Advance Directives:   Advance Care Flowsheet Documentation     Date/Time Healthcare Directive Type of Healthcare Directive Copy in [address] Agent's Name Healthcare Agent's Phone Number    [phone]  No, patient does not have an advance directive for healthcare treatment -- -- -- -- --          Admitting Physician:  Rosey Crisostomo MD  PCP: Jose F Aceves MD    Discharging Nurse: [facility] Unit/Room#: 7YR-2891/8-03  Discharging Unit Phone Number: [phone]    Emergency Contact:   Extended Emergency Contact Information  Primary Emergency Contact: Si Felipe  Address: [address] [place] [address] Phone: [phone]  Relation: Spouse    Past Surgical History:  Past Surgical History:   Procedure Laterality Date    ABDOMEN SURGERY  2013    bowel blockage    ABDOMINAL EXPLORATION SURGERY  1/3/13    ANKLE FRACTURE SURGERY Left 2019    LEFT LATERAL MALLEOLUS ANKLE OPEN REDUCTION INTERNAL FIXATION -Mer  performed by Yolie Luong MD at [address] [place] [address]----- 12    HERNIA REPAIR  2016    ventral    PROSTATE SURGERY      VEIN BYPASS SURGERY      VENTRAL HERNIA REPAIR Right 16    lysis of adhesions       Immunization History:   Immunization History   Administered Date(s) Administered    Influenza Virus Vaccine 2012, 10/13/2019    Influenza Whole 10/04/2015    Pneumococcal Conjugate 7-valent (Prevnar7) 2016    Pneumococcal Polysaccharide ([id number]) 2012       Active Problems:  Patient Active Problem List   Diagnosis Code    CAD (coronary artery disease) I25.10    Cellulitis L03.90    Hyperlipemia E78.5    Abdominal pain R10.9    Partial small bowel obstruction Oral  Liquids: Thin Liquids  Daily Fluid Restriction: no  Last Modified Barium Swallow with Video (Video Swallowing Test): not done    Treatments at the Time of Hospital Discharge:   Respiratory Treatments: none  Oxygen Therapy:  is not on home oxygen therapy. Ventilator:    - No ventilator support    Rehab Therapies: none  Weight Bearing Status/Restrictions: No weight bearing restirctions  Other Medical Equipment (for information only, NOT a DME order): none  Other Treatments: none    Patient's personal belongings (please select all that are sent with patient):  None    RN SIGNATURE:  Electronically signed by Man Cárdenas RN on 3/14/20 at 4:01 PM EDT    CASE MANAGEMENT/SOCIAL WORK SECTION    Inpatient Status Date: ***    Readmission Risk Assessment Score:  Readmission Risk              Risk of Unplanned Readmission:        0           Discharging to Facility/ Agency   · Name:   · Address:  · Phone:  · Fax:    Dialysis Facility (if applicable)   · Name:  · Address:  · Dialysis Schedule:  · Phone:  · Fax:    / signature: {Esignature:[id number]}    PHYSICIAN SECTION    Prognosis: {Prognosis:1933194835}    Condition at Discharge: [address] [place] [address] Patient Condition:093286982}    Rehab Potential (if transferring to Rehab): {Prognosis:7149036908}    Recommended Labs or Other Treatments After Discharge: ***    Physician Certification: I certify the above information and transfer of Martinez Rincon  is necessary for the continuing treatment of the diagnosis listed and that he requires {Admit to Appropriate Level of Care:84715} for {GREATER/LESS:360592474} 30 days.      Update Admission H&P: {P DME Changes in XXFMI:728255598}    PHYSICIAN SIGNATURE:  {Esignature:[id number]}

## 2020-03-16 ENCOUNTER — TELEPHONE (OUTPATIENT)
Dept: CARDIOLOGY CLINIC | Age: 85
End: 2020-03-16

## 2020-03-16 NOTE — TELEPHONE ENCOUNTER
Was in the hospital over the weekend and was told to call today to get heart monitor . There is a note from 3/14/20 DCE saying he wants patient to have 30 day monitor. Please place order in epic .

## 2020-03-17 ENCOUNTER — NURSE ONLY (OUTPATIENT)
Dept: CARDIOLOGY CLINIC | Age: 85
End: 2020-03-17
Payer: MEDICARE

## 2020-03-17 NOTE — DISCHARGE SUMMARY
Hospital Medicine Discharge Summary    Patient: Sandie Singh     Gender: male  : 1935   Age: 82 y.o. 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 Patient was monitored overnight and had no events on telemetry patient was seen by cardiology and was cleared for discharge with follow-up with him as outpatient    Discharge Exam:    /65   Pulse 71   Temp 97.6 °F (36.4 °C) (Oral)   Resp 16   Ht 5' 9\" (1.753 m)   Wt 167 lb (75.8 kg)   SpO2 95%   BMI 24.66 kg/m²   General appearance:  Appears comfortable. AAOx3  HEENT: atraumatic, Pupils equal, muscous membranes moist, no masses appreciated  Cardiovascular: Regular rate and rhythm no murmurs appreciated  Respiratory: CTAB no wheezing  Gastrointestinal: Abdomen soft, non-tender, BS+  EXT: no edema  Neurology: no gross focal deficts  Psychiatry: Appropriate affect.  Not agitated  Skin: Warm, dry, no rashes appreciated       Discharge Medications:   Discharge Medication List as of 3/14/2020  5:48 PM        Discharge Medication List as of 3/14/2020  5:48 PM        Discharge Medication List as of 3/14/2020  5:48 PM      CONTINUE these medications which have NOT CHANGED    Details   metoprolol tartrate (LOPRESSOR) 25 MG tablet Take 25 mg by mouth dailyHistorical Med      Multiple Vitamins-Minerals (THERAPEUTIC MULTIVITAMIN-MINERALS) tablet Take 1 tablet by mouth dailyHistorical Med      pravastatin (PRAVACHOL) 20 MG tablet TAKE ONE TABLET BY MOUTH DAILY, Disp-90 tablet, R-3Normal      aspirin 81 MG EC tablet Take 1 tablet by mouth daily. , Disp-30 tablet, R-6           Discharge Medication List as of 3/14/2020  5:48 PM          Labs: For convenience and continuity at follow-up the following most recent labs are provided:    Lab Results   Component Value Date    WBC 6.3 03/14/2020    HGB 13.9 03/14/2020    HCT 41.0 03/14/2020    MCV 89.6 03/14/2020     03/14/2020     03/14/2020    K 4.7 03/14/2020     03/14/2020    CO2 27 03/14/2020    BUN 18 03/14/2020    CREATININE 0.8 03/14/2020    CALCIUM 9.4 03/14/2020    PHOS 2.8 05/05/2016     07/09/2012    ALKPHOS 75 03/13/2020    ALT 21 03/13/2020    AST 31 03/13/2020    BILITOT 0.6 03/13/2020    BILIDIR <0.2 12/12/2016    LABALBU 4.0 03/13/2020    LDLCALC 80 04/09/2019    TRIG 81 04/09/2019     Lab Results   Component Value Date    INR 1.18 (H) 04/19/2016    INR 1.01 11/20/2012    INR 1.05 06/25/2012       Radiology:  Ct Head Wo Contrast    Result Date: 3/13/2020  EXAMINATION: CT OF THE HEAD WITHOUT CONTRAST  3/13/2020 1:08 pm TECHNIQUE: CT of the head was performed without the administration of intravenous contrast. Dose modulation, iterative reconstruction, and/or weight based adjustment of the mA/kV was utilized to reduce the radiation dose to as low as reasonably achievable. COMPARISON: April 14, 2019 HISTORY: ORDERING SYSTEM PROVIDED HISTORY: head injury TECHNOLOGIST PROVIDED HISTORY: Reason for exam:->head injury Has a \"code stroke\" or \"stroke alert\" been called? ->No Reason for Exam: head injury Acuity: Acute Type of Exam: Initial Pain.  FINDINGS: BRAIN/VENTRICLES: There is no acute intracranial HISTORY: ORDERING SYSTEM PROVIDED HISTORY: syncope TECHNOLOGIST PROVIDED HISTORY: Reason for exam:-> syncope Reason for Exam: Loss of Consciousness (Pt states while standing in his kitchen he became dizzy and passed out, laceration to the posterior head. Pt states hx of same. ) FINDINGS: The cardiopericardial silhouette is stable in size and configuration. Atherosclerotic calcification of the thoracic aorta is again noted. Surgical changes of a median sternotomy again noted. The visible lungs are without pneumothorax, pleural effusion or new focal airspace opacity. No evidence of acute cardiopulmonary disease.          Signed:    Sree Oseguera MD   3/17/2020

## 2020-04-20 PROCEDURE — 93228 REMOTE 30 DAY ECG REV/REPORT: CPT | Performed by: INTERNAL MEDICINE

## 2020-08-24 RX ORDER — PRAVASTATIN SODIUM 20 MG
TABLET ORAL
Qty: 90 TABLET | Refills: 2 | Status: SHIPPED | OUTPATIENT
Start: 2020-08-24 | End: 2020-10-23

## 2020-10-23 ENCOUNTER — OFFICE VISIT (OUTPATIENT)
Dept: CARDIOLOGY CLINIC | Age: 85
End: 2020-10-23
Payer: MEDICARE

## 2020-10-23 VITALS
HEIGHT: 69 IN | WEIGHT: 169.3 LBS | BODY MASS INDEX: 25.07 KG/M2 | HEART RATE: 64 BPM | DIASTOLIC BLOOD PRESSURE: 80 MMHG | SYSTOLIC BLOOD PRESSURE: 120 MMHG

## 2020-10-23 PROCEDURE — 99214 OFFICE O/P EST MOD 30 MIN: CPT | Performed by: INTERNAL MEDICINE

## 2020-10-23 RX ORDER — LEVOTHYROXINE SODIUM 0.03 MG/1
25 TABLET ORAL DAILY
COMMUNITY

## 2020-10-23 NOTE — PROGRESS NOTES
Provider, MD   aspirin 81 MG EC tablet Take 1 tablet by mouth daily. 3/18/13  Yes Serafin Dougherty MD        Allergies:  Patient has no known allergies. Review of Systems:   · Constitutional: there has been no unanticipated weight loss. There's been no change in energy level, sleep pattern, or activity level. · Eyes: No visual changes or diplopia. No scleral icterus. · ENT: No Headaches, hearing loss or vertigo. No mouth sores or sore throat. · Cardiovascular: Reviewed in HPI  · Respiratory: No cough or wheezing, no sputum production. No hematemesis. · Gastrointestinal: No abdominal pain, appetite loss, blood in stools. No change in bowel or bladder habits. · Genitourinary: No dysuria, trouble voiding, or hematuria. · Musculoskeletal:  No gait disturbance, weakness or joint complaints. +cane for support   · Integumentary: No rash or pruritis. · Neurological: No headache, diplopia, change in muscle strength, numbness or tingling. No change in gait, balance, coordination, mood, affect, memory, mentation, behavior. · Psychiatric: No anxiety, no depression. · Endocrine: No malaise, fatigue or temperature intolerance. No excessive thirst, fluid intake, or urination. No tremor. · Hematologic/Lymphatic: No abnormal bruising or bleeding, blood clots or swollen lymph nodes. · Allergic/Immunologic: No nasal congestion or hives.     Physical Examination:    Vitals:    10/23/20 0911   BP: 120/80   Pulse: 64        Wt Readings from Last 1 Encounters:   10/23/20 169 lb 4.8 oz (76.8 kg)       Constitutional and General Appearance: NAD  Skin:good turgor,intact without lesions  HEENT: EOMI ,normal  Neck:no JVD    Respiratory:  · Normal excursion and expansion without use of accessory muscles  · Resp Auscultation: Normal breath sounds without dullness  Cardiovascular:  · The apical impulses not displaced  · Heart tones are crisp and normal  · Cervical veins are not engorged  · The carotid upstroke is normal in amplitude and contour without delay or bruit  · Peripheral pulses are symmetrical and full  · There is no clubbing, cyanosis of the extremities. · No edema  · Femoral Arteries: 2+ and equal  · Pedal Pulses: 2+ and equal   Abdomen:  · No masses or tenderness  · Liver/Spleen: No Abnormalities Noted  Neurological/Psychiatric:  · Alert and oriented in all spheres  · Moves all extremities well  · Exhibits normal gait balance and coordination  · No abnormalities of mood, affect, memory, mentation, or behavior are noted      Assessment:  1. Coronary artery disease involving native heart without angina pectoris, unspecified vessel or lesion type   ~stable : offers no c/o angina  ~s/p CABG June '12  ~ASA / statin       2. Essential hypertension   ~controlled on current regimen     3.  Carotid Stenosis  6/15/17> tanvi ICA 16-49% by US   6/19/19> stable: tanvi ICA <50%     4. Mixed hyperlipidemia   Controlled   ~remains on pravachol     5. Dizziness/lightheadness- does not appear cardiac related- no change in BP standing       Plan:    Robby Henry has a stable cardiac status. Lab results reviewed this visit and discussed. No med changes. Continue risk factor modifications. Call for any change in symptoms. Return for regular follow up in 6 months with the NP, 12 months with me. I appreciate the opportunity of cooperating in the care of this individual.    Emi Hickey. Yohannes Gonzales M.D., [facility]    Patient's problem list, medications, allergies, past medical, surgical, social and family histories were reviewed and updated as appropriate. Scribe's attestation: This note was scribed in the presence of Dr. Yohannes Gonzales MD, by Lion Bustamante RN. The scribe's documentation has been prepared under my direction and personally reviewed by me in its entirety. I confirm that the note above accurately reflects all work, treatment, procedures, and medical decision making performed by me.

## 2021-08-17 RX ORDER — PRAVASTATIN SODIUM 20 MG
TABLET ORAL
Qty: 90 TABLET | Refills: 2 | OUTPATIENT
Start: 2021-08-17

## 2021-10-11 NOTE — PROGRESS NOTES
[address]   Cardiac Follow Up     Referring Provider:  [facility] MD     Chief Complaint   Patient presents with    1 Year Follow Up    Coronary Artery Disease    Hypertension    Hyperlipidemia        History of Present Illness:  Layne Angulo is 82 y.o. male with a history of CAD s/p CABG June 2012, HTN, hyperlipidemia and tanvi carotid stenosis. Today he reports he is ok, he is using a cane for balance assist. He states he is frequently lightheaded, this is worse when standing. His BP is unchanged when standing. He continues to work, he works in security at [employer]. He had a recent echo through [address] [place] [address], no murmur on exam.     Past Medical History:   has a past medical history of Arthritis, CAD (coronary artery disease), GERD (gastroesophageal reflux disease), Hyperlipidemia, MI (myocardial infarction) ([facility] Myocardial infarct ([facility] and Thyroid disease. Surgical History:   has a past surgical history that includes Vein bypass surgery; Cardiac surgery; Abdominal exploration surgery (1/3/13); ventral hernia repair (Right, 4/5/16); hernia repair (4/5/2016); Abdomen surgery (2013); Prostate surgery; and Ankle fracture surgery (Left, 6/27/2019). Social History:   reports that he has never smoked. He has never used smokeless tobacco. He reports that he does not drink alcohol and does not use drugs. Family History:  family history includes Diabetes in his father; Heart Attack in his father and mother; Heart Disease in his father and mother. Home Medications:  Prior to Admission medications    Medication Sig Start Date End Date Taking?  Authorizing Provider   levothyroxine (SYNTHROID) 25 MCG tablet Take 25 mcg by mouth Daily   Yes Historical Provider, MD   metoprolol tartrate (LOPRESSOR) 25 MG tablet Take 25 mg by mouth daily   Yes Historical Provider, MD   Multiple Vitamins-Minerals (THERAPEUTIC MULTIVITAMIN-MINERALS) tablet Take 1 tablet by mouth daily   Yes Historical Provider, MD   aspirin 81 MG EC tablet Take 1 tablet by mouth daily. 3/18/13  Yes Damion Conway MD        Allergies:  Patient has no known allergies. Review of Systems:   · Constitutional: there has been no unanticipated weight loss. There's been no change in energy level, sleep pattern, or activity level. · Eyes: No visual changes or diplopia. No scleral icterus. · ENT: No Headaches, hearing loss or vertigo. No mouth sores or sore throat. · Cardiovascular: Reviewed in HPI  · Respiratory: No cough or wheezing, no sputum production. No hematemesis. · Gastrointestinal: No abdominal pain, appetite loss, blood in stools. No change in bowel or bladder habits. · Genitourinary: No dysuria, trouble voiding, or hematuria. · Musculoskeletal:  No gait disturbance, weakness or joint complaints. +cane for support   · Integumentary: No rash or pruritis. · Neurological: No headache, diplopia, change in muscle strength, numbness or tingling. No change in gait, balance, coordination, mood, affect, memory, mentation, behavior. · Psychiatric: No anxiety, no depression. · Endocrine: No malaise, fatigue or temperature intolerance. No excessive thirst, fluid intake, or urination. No tremor. · Hematologic/Lymphatic: No abnormal bruising or bleeding, blood clots or swollen lymph nodes. · Allergic/Immunologic: No nasal congestion or hives.     Physical Examination:    Vitals:    10/27/21 1402   BP: (!) 144/72   Pulse: 62   SpO2: 97%        Wt Readings from Last 1 Encounters:   10/27/21 172 lb (78 kg)       Constitutional and General Appearance: NAD  Skin:good turgor,intact without lesions  HEENT: EOMI ,normal  Neck:no JVD    Respiratory:  · Normal excursion and expansion without use of accessory muscles  · Resp Auscultation: Normal breath sounds without dullness  Cardiovascular:  · The apical impulses not displaced  · Heart tones are crisp and normal  · Cervical veins are not engorged  · The carotid upstroke presence of Dr. Suzanne Vasquez MD, by Nilesh Gabriel RN. The scribe's documentation has been prepared under my direction and personally reviewed by me in its entirety. I confirm that the note above accurately reflects all work, treatment, procedures, and medical decision making performed by me.

## 2021-10-27 ENCOUNTER — OFFICE VISIT (OUTPATIENT)
Dept: CARDIOLOGY CLINIC | Age: 86
End: 2021-10-27
Payer: MEDICARE

## 2021-10-27 VITALS
SYSTOLIC BLOOD PRESSURE: 144 MMHG | DIASTOLIC BLOOD PRESSURE: 72 MMHG | BODY MASS INDEX: 25.48 KG/M2 | WEIGHT: 172 LBS | HEIGHT: 69 IN | HEART RATE: 62 BPM | OXYGEN SATURATION: 97 %

## 2021-10-27 DIAGNOSIS — I65.23 BILATERAL CAROTID ARTERY STENOSIS: ICD-10-CM

## 2021-10-27 DIAGNOSIS — E78.2 MIXED HYPERLIPIDEMIA: ICD-10-CM

## 2021-10-27 DIAGNOSIS — I34.0 MITRAL VALVE INSUFFICIENCY, UNSPECIFIED ETIOLOGY: ICD-10-CM

## 2021-10-27 DIAGNOSIS — I25.10 CORONARY ARTERY DISEASE INVOLVING NATIVE HEART WITHOUT ANGINA PECTORIS, UNSPECIFIED VESSEL OR LESION TYPE: ICD-10-CM

## 2021-10-27 DIAGNOSIS — I10 PRIMARY HYPERTENSION: Primary | ICD-10-CM

## 2021-10-27 PROCEDURE — 99214 OFFICE O/P EST MOD 30 MIN: CPT | Performed by: INTERNAL MEDICINE

## 2023-08-17 ENCOUNTER — TELEPHONE (OUTPATIENT)
Dept: CARDIOLOGY CLINIC | Age: 88
End: 2023-08-17

## 2023-08-17 NOTE — TELEPHONE ENCOUNTER
Pt called to schedule a yearly fu and wants to stay in the [place] area. Please see where the Pt can be scheduled in at.   Thank you

## 2023-08-21 NOTE — TELEPHONE ENCOUNTER
Please call and offer/schedule apt on 9/7/23 at 9:15 am with L[facility] at [facility].

## 2023-08-23 NOTE — TELEPHONE ENCOUNTER
Spoke w/wife she will have the Pt to call to schedule his appt w/LES on 09/07 at 9:15am -  ok per Ritesh Beaulieu rn

## 2023-08-25 ENCOUNTER — PROCEDURE VISIT (OUTPATIENT)
Dept: AUDIOLOGY | Age: 88
End: 2023-08-25

## 2023-08-25 ENCOUNTER — OFFICE VISIT (OUTPATIENT)
Dept: ENT CLINIC | Age: 88
End: 2023-08-25
Payer: MEDICARE

## 2023-08-25 VITALS
DIASTOLIC BLOOD PRESSURE: 64 MMHG | BODY MASS INDEX: 25.4 KG/M2 | OXYGEN SATURATION: 94 % | HEART RATE: 73 BPM | SYSTOLIC BLOOD PRESSURE: 129 MMHG | HEIGHT: 69 IN | TEMPERATURE: 97.2 F

## 2023-08-25 DIAGNOSIS — H61.23 IMPACTED CERUMEN OF BOTH EARS: ICD-10-CM

## 2023-08-25 DIAGNOSIS — H91.90 HEARING LOSS, UNSPECIFIED HEARING LOSS TYPE, UNSPECIFIED LATERALITY: Primary | ICD-10-CM

## 2023-08-25 DIAGNOSIS — H90.3 ASYMMETRIC SNHL (SENSORINEURAL HEARING LOSS): Chronic | ICD-10-CM

## 2023-08-25 DIAGNOSIS — H93.13 TINNITUS OF BOTH EARS: ICD-10-CM

## 2023-08-25 DIAGNOSIS — R42 DIZZINESS: ICD-10-CM

## 2023-08-25 DIAGNOSIS — H90.3 SENSORINEURAL HEARING LOSS, BILATERAL: Primary | ICD-10-CM

## 2023-08-25 DIAGNOSIS — R42 DIZZINESS: Primary | Chronic | ICD-10-CM

## 2023-08-25 PROCEDURE — 1123F ACP DISCUSS/DSCN MKR DOCD: CPT | Performed by: OTOLARYNGOLOGY

## 2023-08-25 PROCEDURE — 69210 REMOVE IMPACTED EAR WAX UNI: CPT | Performed by: OTOLARYNGOLOGY

## 2023-08-25 PROCEDURE — 99204 OFFICE O/P NEW MOD 45 MIN: CPT | Performed by: OTOLARYNGOLOGY

## 2023-08-25 ASSESSMENT — ENCOUNTER SYMPTOMS
SINUS PAIN: 0
SORE THROAT: 0
RHINORRHEA: 0

## 2023-08-25 NOTE — PROGRESS NOTES
WO CONTRAST    Asymmetric SNHL (sensorineural hearing loss)  -     MRI IAC POSTERIOR FOSSA W WO CONTRAST    Impacted cerumen of both ears [M98.46 (ICD-10-CM)]         RECOMMENDATIONS/PLAN      MRI IACs. VNG testing. Meclizine 25 mg po TID as needed for dizziness. Hold for 72 hours prior to ENG/VNG testing. Return for recheck/follow-up after VNG testing and MRI scan.

## 2023-08-25 NOTE — PATIENT INSTRUCTIONS
machine or a device that sits under your pillow to play soothing sounds, like ocean waves. Smart phones have free apps, such as Whist, Relax Melodies, ReSound Relief, and [employer]. These apps have different types of sounds/noise, some of which you can blend together to find sounds that are most soothing to you. Hearing aid technology, especially when there is some hearing loss, may help reduce tinnitus symptoms by giving your brain better access to the sounds it is missing. There are some hearing aids with built-in noise generator programs, which may help when amplification alone is not enough. Additional resources may be found through the American [employer]inn[employer] Association at www.wilber.org    When should you call for help? Call 911 anytime you think you may need emergency care. For example, call if:    You have symptoms of a stroke. These may include:  Sudden numbness, tingling, weakness, or loss of movement in your face, arm, or leg, especially on only one side of your body. Sudden vision changes. Sudden trouble speaking. Sudden confusion or trouble understanding simple statements. Sudden problems with walking or balance. A sudden, severe headache that is different from past headaches. Call your doctor now or seek immediate medical care if:    You develop other symptoms. These may include hearing loss (or worse hearing loss), balance problems, dizziness, nausea, or vomiting. Watch closely for changes in your health, and be sure to contact your doctor if:    Your tinnitus moves from both ears to one ear. Your hearing loss gets worse within 1 day after an ear injury. Your tinnitus or hearing loss does not get better within 1 week after an ear injury. Your tinnitus bothers you enough that you want to take medicines to help you cope with it.       If you notice changes in your tinnitus and/or your hearing, it is recommended that you have your hearing tested by your audiologist and to

## 2023-08-25 NOTE — PROGRESS NOTES
Audrey Gongora   1935, 82 y.o. male   [id number]       Referring Provider: Namrata Kahn MD  Referral Type: In an order in [address] [place] [address]    Reason for Visit: Evaluation of suspected change in hearing, tinnitus, or balance. ADULT AUDIOLOGIC EVALUATION      Audrey Gongora is a 82 y.o. male seen today, 8/25/2023 , for an initial audiologic evaluation. Patient was seen by Namrata Kahn MD following today's evaluation. AUDIOLOGIC AND OTHER PERTINENT MEDICAL HISTORY:      Audrey Gongora reports history of vertigo for years that is constant. He says he has some spinning lasting half a second and overall imbalance while walking. He currently ambulates with a cane and wears [employer] hearing aids bilaterally obtained through [employer]. He says he gets them yearly through his insurance and his last hearing test was this June 2023, denies any changes in hearing since. Patient also notes constant non-bothersome tinnitus that can change with stress. 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 He denied otalgia, aural fullness, otorrhea, history of head trauma, history of ear surgery, and family history of hearing loss    Date: 8/25/2023     IMPRESSIONS:      Today's results revealed symmetric sensorineural hearing loss bilaterally with asymmetric speech understanding when in quiet that is poor in the left ear. Tympanometry indicates low movement of ear drum/tympanic membrane, consistent with middle ear abnormality. Discussed test results and implications with patient. Discussed use of tinnitus management strategies. Patient should return to audiologist for adjustments, however, patient may still experience issues with speech in noise due to poor word recognition in the left ear. .  Follow medical recommendations of Namrata Kahn MD.     ASSESSMENT AND FINDINGS:     Otoscopy revealed occluding cerumen bilaterally, ear cleaning performed by Dr. Bradly Becker prior to testing

## 2023-08-31 ENCOUNTER — OFFICE VISIT (OUTPATIENT)
Dept: CARDIOLOGY CLINIC | Age: 88
End: 2023-08-31
Payer: MEDICARE

## 2023-08-31 VITALS
WEIGHT: 171.4 LBS | HEIGHT: 69 IN | BODY MASS INDEX: 25.39 KG/M2 | HEART RATE: 72 BPM | SYSTOLIC BLOOD PRESSURE: 148 MMHG | OXYGEN SATURATION: 96 % | DIASTOLIC BLOOD PRESSURE: 64 MMHG

## 2023-08-31 DIAGNOSIS — E78.2 MIXED HYPERLIPIDEMIA: ICD-10-CM

## 2023-08-31 DIAGNOSIS — I25.10 CORONARY ARTERY DISEASE INVOLVING NATIVE CORONARY ARTERY OF NATIVE HEART WITHOUT ANGINA PECTORIS: Primary | ICD-10-CM

## 2023-08-31 DIAGNOSIS — I10 PRIMARY HYPERTENSION: ICD-10-CM

## 2023-08-31 PROCEDURE — 99214 OFFICE O/P EST MOD 30 MIN: CPT | Performed by: NURSE PRACTITIONER

## 2023-08-31 PROCEDURE — 93000 ELECTROCARDIOGRAM COMPLETE: CPT | Performed by: NURSE PRACTITIONER

## 2023-08-31 PROCEDURE — 1123F ACP DISCUSS/DSCN MKR DOCD: CPT | Performed by: NURSE PRACTITIONER

## 2023-08-31 NOTE — PROGRESS NOTES
[facility]     Outpatient Follow Up Note    Cyndi Julian is 82 y.o. male who presents today with a history of CAD s/p CABG June '12, HTN, hyperlipidemia and tanvi carotid stenosis. CHIEF COMPLAINT / HPI:  Follow Up secondary to coronary artery disease    Subjective:   He denies significant chest pain. There is no SOB/MALIK. The patient denies orthopnea/PND. The patient has swelling in his legs. The patients weight is stable . The patient is not experiencing palpitations or dizziness. These symptoms show no change since the last OV Oct '21. With regard to medication therapy the patient has been compliant with prescribed regimen. They have tolerated therapy to date. Past Medical History:   Diagnosis Date    Arthritis     CAD (coronary artery disease)     GERD (gastroesophageal reflux disease)     Hyperlipidemia     MI (myocardial infarction) ([address])     Myocardial infarct ([address])     40 years ago    Thyroid disease      Social History:    Social History     Tobacco Use   Smoking Status Never   Smokeless Tobacco Never     Current Medications:  Current Outpatient Medications   Medication Sig Dispense Refill    levothyroxine (SYNTHROID) 25 MCG tablet Take 1 tablet by mouth Daily      Multiple Vitamins-Minerals (THERAPEUTIC MULTIVITAMIN-MINERALS) tablet Take 1 tablet by mouth daily      aspirin 81 MG EC tablet Take 1 tablet by mouth daily. 30 tablet 6     No current facility-administered medications for this visit. REVIEW OF SYSTEMS:    CONSTITUTIONAL: No major weight gain or loss, fatigue, weakness, night sweats or fever. HEENT: No new vision difficulties or ringing in the ears. RESPIRATORY: No new SOB, PND, orthopnea or cough. CARDIOVASCULAR: See HPI  GI: No nausea, vomiting, diarrhea, constipation, abdominal pain or changes in bowel habits. : No urinary frequency, urgency, incontinence hematuria or dysuria; follows with urology d/t elevated PSA.   SKIN: No cyanosis or skin

## 2023-08-31 NOTE — PATIENT INSTRUCTIONS
Consider starting atorvastatin since you have coronary artery disease and your bad cholesterol (LDL) is not at goal by diet alone.  We'd like to see it <      Echocardiogram : ultrasound of your heart to check the strength    Appt in one year

## 2023-09-05 ENCOUNTER — PROCEDURE VISIT (OUTPATIENT)
Dept: AUDIOLOGY | Age: 88
End: 2023-09-05
Payer: MEDICARE

## 2023-09-05 ENCOUNTER — PATIENT MESSAGE (OUTPATIENT)
Dept: ENT CLINIC | Age: 88
End: 2023-09-05

## 2023-09-05 DIAGNOSIS — R42 DIZZINESS: Primary | ICD-10-CM

## 2023-09-05 DIAGNOSIS — R42 DIZZINESS AND GIDDINESS: Primary | ICD-10-CM

## 2023-09-05 PROCEDURE — 92537 CALORIC VSTBLR TEST W/REC: CPT | Performed by: AUDIOLOGIST

## 2023-09-05 PROCEDURE — 92540 BASIC VESTIBULAR EVALUATION: CPT | Performed by: AUDIOLOGIST

## 2023-09-05 NOTE — PROGRESS NOTES
Ousmane Gandhi  1935, 82 y.o. male   [id number]     Referring Provider: Belkys Bruno MD  Referral Type: In an order in [address] [place] [address]    Reason for Visit: Evaluation of suspected change in hearing, tinnitus, or balance. VESTIBULAR EVALUATION - VIDEONYSTAGMOGRAPHY (VNG)      Ousmane Gandhi was seen today, 9/5/2023, for videonystagmography (VNG) evaluation. The VNG is an examination of the central vestibulo-ocular pathway that is measured via eye movements. IMPRESSIONS:      Abnormal VNG. Central subtests were abnormal, including OPKs, smooth pursuit integrity and gain, and saccade velocity and accuracy. Additional indications of possible central contributor include low-velocity direction-fixed nystagmus for gaze right without fixation, down-beating gaze left without fixation, and down-beating and low-velocity right-beating nystagmus for post-headshake. Positional testing was within normal limits. Please consider age with interpretation of central subtests. VESTIBULAR/AUDIOLOGIC AND PERTINENT MEDICAL HISTORY:      Chief Complaint/Description of Symptoms:      Description:  imbalance while walking, lightheadedness when standing up, uses a cane; Onset: years ago;      Duration: 8-9 years;      Frequency: every day;      Symptoms worse since onset. Symptoms alleviated by: no known factors;  Symptoms made worse by: no known factors    Patient's current ranking of dizziness/imbalance/unsteadiness on a scale of 0-10 overall for today: \"as it always is\"/10    Activities that provoke or aggravate symptoms and other associated symptoms:  Positional Changes: standing up from sitting or lying down bending down  Sensory/Gait Disturbances:  uses a cane, feels he will fall to one side or hter other  Pressure/Sound Induced Vertigo/Dizziness/Imbalance:  none  Psychological Factors: stress  Cardiovascular Factors: lightheadedness with sit to stand position change, sees cardiologist    Previous History of

## 2023-09-06 RX ORDER — MECLIZINE HCL 12.5 MG/1
12.5 TABLET ORAL 3 TIMES DAILY PRN
Qty: 60 TABLET | Refills: 0 | Status: SHIPPED | OUTPATIENT
Start: 2023-09-06 | End: 2023-09-26

## 2023-09-06 NOTE — TELEPHONE ENCOUNTER
There are no medications for lightheadedness. At the visit, I discussed Meclizine for dizziness which, I advised him, is available over the counter. I will send in a prescription for Meclizine and, if not covered by his insurer, he can get it over the counter.

## 2023-09-06 NOTE — TELEPHONE ENCOUNTER
From: Fletcher Multani  To: Dr. Figueroa Home: 9/5/2023 8:35 PM EDT  Subject: Prescription    Dr. Micheal Jacobs ordered a Prescription for light headiness, [facility] on [address] [phone]. Miguel Brink [address] [place] [address]. [phone]. Thanks for help in this matter.  I havent heard from [facility] regarding Prescriptin

## 2023-09-07 ENCOUNTER — TELEPHONE (OUTPATIENT)
Dept: ENT CLINIC | Age: 88
End: 2023-09-07

## 2023-09-07 NOTE — TELEPHONE ENCOUNTER
Refill Request:     Last Office Visit:  8/25/2023     Next Scheduled Visit : 9/21/2023     Medication Requested:    Pharmacy:    [facility] [id number] - [address] [place] [address]  Phone: [phone] Fax: [phone]

## 2023-09-21 ENCOUNTER — OFFICE VISIT (OUTPATIENT)
Dept: ENT CLINIC | Age: 88
End: 2023-09-21

## 2023-09-21 VITALS
HEART RATE: 69 BPM | OXYGEN SATURATION: 96 % | TEMPERATURE: 97.6 F | HEIGHT: 69 IN | BODY MASS INDEX: 25.03 KG/M2 | DIASTOLIC BLOOD PRESSURE: 72 MMHG | WEIGHT: 169 LBS | SYSTOLIC BLOOD PRESSURE: 128 MMHG

## 2023-09-21 DIAGNOSIS — H90.3 ASYMMETRIC SNHL (SENSORINEURAL HEARING LOSS): Chronic | ICD-10-CM

## 2023-09-21 DIAGNOSIS — H81.4 VERTIGO OF CENTRAL ORIGIN: Primary | Chronic | ICD-10-CM

## 2023-09-21 NOTE — PATIENT INSTRUCTIONS
recommend only use of one the several ear wax removal kits available \"over the counter\" if you feel a need to try to remove ear wax. No other methods should be self used for this purpose as there is danger of injury to the ear and risk of irreparable and irreversible permanent hearing loss.

## 2023-09-21 NOTE — PROGRESS NOTES
[address] [facility]         Chief Complaint   Patient presents with    Dizziness       HISTORY OF PRESENT ILLNESS       Dannie Dancer is a 82 y.o. male here for follow up of dizziness. He did not get the MRI of the IACs. AUDIOGRAM            VNG  (see full report for details)        VESTIBULAR EVALUATION - VIDEONYSTAGMOGRAPHY (VNG)        Dannie Dancer was seen today, 9/5/2023, for videonystagmography (VNG) evaluation. The VNG is an examination of the central vestibulo-ocular pathway that is measured via eye movements. IMPRESSIONS:       Abnormal VNG. Central subtests were abnormal, including OPKs, smooth pursuit integrity and gain, and saccade velocity and accuracy. Additional indications of possible central contributor include low-velocity direction-fixed nystagmus for gaze right without fixation, down-beating gaze left without fixation, and down-beating and low-velocity right-beating nystagmus for post-headshake. Positional testing was within normal limits. Please consider age with interpretation of central subtests. COUNSELING    The audiogram results were reviewed and discussed with the patient, whom I advised of the type and severity of hearing loss, the probable etiology, possible associated impairment, need for noise protection and avoidance, possible benefits of hearing aids, or other amplification therapy. I discussed the etiology of tinnitus and treatment/coping measures including masking techniques, and need for annual and prn hearing tests. Patient was advised of difficulty understanding speech in the presence of moderate to high volume background noise. The VNG results were reviewed and discussed with the patient, whom I advised of the significance of the findings.   I discussed the functions of the vestibular system and the probable etiology and diagnosis, vestibular dysfunction and impairment

## 2023-09-28 ENCOUNTER — HOSPITAL ENCOUNTER (OUTPATIENT)
Dept: PHYSICAL THERAPY | Age: 88
Setting detail: THERAPIES SERIES
Discharge: HOME | End: 2023-09-28

## 2023-09-28 ENCOUNTER — TELEPHONE (OUTPATIENT)
Dept: ENT CLINIC | Age: 88
End: 2023-09-28

## 2023-09-28 NOTE — TELEPHONE ENCOUNTER
Pt would like referral for therapy to be changed from [address] [facility] to [email or web address] located at [address] [facility] in [facility], [address] [place] [address], phone [phone]. Pt is requesting this change due to his [address] [facility] will no longer be taking. Please advise. Thank you!

## 2023-09-29 NOTE — TELEPHONE ENCOUNTER
Called pt and he decided to not change the referral -- He will continue therapy through [address] [place] [address].

## 2023-09-29 NOTE — TELEPHONE ENCOUNTER
Please call patient ASAP and advise him that this is now unnecessary.   [facility] ([facility] has reached an agreement with Chaim and he may continue with [facility] ([facility]) and does not need referral to [facility]

## 2023-10-03 ENCOUNTER — HOSPITAL ENCOUNTER (OUTPATIENT)
Dept: PHYSICAL THERAPY | Age: 88
Setting detail: THERAPIES SERIES
Discharge: HOME | End: 2023-10-03
Attending: OTOLARYNGOLOGY
Payer: MEDICARE

## 2023-10-03 PROCEDURE — 97162 PT EVAL MOD COMPLEX 30 MIN: CPT

## 2023-10-03 PROCEDURE — 97530 THERAPEUTIC ACTIVITIES: CPT

## 2023-10-03 NOTE — FLOWSHEET NOTE
Modalities  Min:            Other Therapeutic Activities: Pt was educated on PT POC, Diagnosis, Prognosis, pathomechanics as well as frequency and duration of scheduling future physical therapy appointments. Time was also taken on this day to answer all patient questions and participation in PT. Reviewed appointment policy in detail with patient and patient verbalized understanding. Home Exercise Program: Patient was instructed in the following for HEP:      Patient verbalized/demonstrated understanding and was issued written handout. Therapeutic Exercise and NMR EXR  [] (39936) Provided verbal/tactile cueing for activities related to strengthening, flexibility, endurance, ROM for improvements in LE, proximal hip, and core control with self care, mobility, lifting, ambulation.  [] (23937) Provided verbal/tactile cueing for activities related to improving balance, coordination, kinesthetic sense, posture, motor skill, proprioception  to assist with LE, proximal hip, and core control in self care, mobility, lifting, ambulation and eccentric single leg control.  [address] and Therapeutic Activities:    [] (25467 or 05994) Provided verbal/tactile cueing for activities related to improving balance, coordination, kinesthetic sense, posture, motor skill, proprioception and motor activation to allow for proper function of core, proximal hip and LE with self care and ADLs and functional mobility.   [] (47199) Gait Re-education- Provided training and instruction to the patient for proper LE, core and proximal hip recruitment and positioning and eccentric body weight control with ambulation re-education including up and down stairs     Home Exercise Program:    [x] (52071) Reviewed/Progressed HEP activities related to strengthening, flexibility, endurance, ROM of core, proximal hip and LE for functional self-care, mobility, lifting and ambulation/stair navigation   [] (85632)Reviewed/Progressed HEP activities

## 2023-11-01 ENCOUNTER — HOSPITAL ENCOUNTER (OUTPATIENT)
Dept: MRI IMAGING | Age: 88
Discharge: HOME | End: 2023-11-01
Payer: MEDICARE

## 2023-11-01 DIAGNOSIS — R42 DIZZINESS: ICD-10-CM

## 2023-11-01 DIAGNOSIS — H90.3 ASYMMETRIC SNHL (SENSORINEURAL HEARING LOSS): ICD-10-CM

## 2023-11-01 LAB
ALBUMIN SERPL-MCNC: 4.2 G/DL (ref 3.4–5)
ALBUMIN/GLOB SERPL: 1.3 {RATIO} (ref 1.1–2.2)
ALP SERPL-CCNC: 94 U/L (ref 40–129)
ALT SERPL-CCNC: 19 U/L (ref 10–40)
ANION GAP SERPL CALCULATED.3IONS-SCNC: 11 MMOL/L (ref 3–16)
AST SERPL-CCNC: 28 U/L (ref 15–37)
BILIRUB SERPL-MCNC: 0.6 MG/DL (ref 0–1)
BUN SERPL-MCNC: 25 MG/DL (ref 7–20)
CALCIUM SERPL-MCNC: 9.8 MG/DL (ref 8.3–10.6)
CHLORIDE SERPL-SCNC: 99 MMOL/L (ref 99–110)
CO2 SERPL-SCNC: 26 MMOL/L (ref 21–32)
CREAT SERPL-MCNC: 1 MG/DL (ref 0.8–1.3)
GFR SERPLBLD CREATININE-BSD FMLA CKD-EPI: >60 ML/MIN/{1.73_M2}
GLUCOSE SERPL-MCNC: 100 MG/DL (ref 70–99)
POTASSIUM SERPL-SCNC: 4.6 MMOL/L (ref 3.5–5.1)
PROT SERPL-MCNC: 7.5 G/DL (ref 6.4–8.2)
SODIUM SERPL-SCNC: 136 MMOL/L (ref 136–145)

## 2023-11-01 PROCEDURE — 6360000004 HC RX CONTRAST MEDICATION: Performed by: OTOLARYNGOLOGY

## 2023-11-01 PROCEDURE — A9577 INJ MULTIHANCE: HCPCS | Performed by: OTOLARYNGOLOGY

## 2023-11-01 PROCEDURE — 36415 COLL VENOUS BLD VENIPUNCTURE: CPT

## 2023-11-01 PROCEDURE — 80053 COMPREHEN METABOLIC PANEL: CPT

## 2023-11-01 PROCEDURE — 70553 MRI BRAIN STEM W/O & W/DYE: CPT

## 2023-11-01 RX ADMIN — GADOBENATE DIMEGLUMINE 14 ML: 529 INJECTION, SOLUTION INTRAVENOUS at 15:48

## 2023-12-04 ENCOUNTER — TELEPHONE (OUTPATIENT)
Dept: CARDIOLOGY CLINIC | Age: 88
End: 2023-12-04

## 2023-12-04 NOTE — TELEPHONE ENCOUNTER
Received fax from [facility] for cardiac clearance, procedure scheduled 12/7/23. Per TS NP , need echo that was ordered at last OV in August, in order to try to proceed with clearance. Spoke to manager Ruby who ok'd echo tomorrow at 8 am > pt is now on the schedule and wife, Debra North, is agreeable > meghann states pt is unavailable at this time because he worked recently and is now asleep.

## 2023-12-05 ENCOUNTER — HOSPITAL ENCOUNTER (OUTPATIENT)
Dept: NON INVASIVE DIAGNOSTICS | Age: 88
Discharge: HOME | End: 2023-12-05
Payer: MEDICARE

## 2023-12-05 DIAGNOSIS — I25.10 CORONARY ARTERY DISEASE INVOLVING NATIVE CORONARY ARTERY OF NATIVE HEART WITHOUT ANGINA PECTORIS: ICD-10-CM

## 2023-12-05 PROCEDURE — 93356 MYOCRD STRAIN IMG SPCKL TRCK: CPT

## 2023-12-05 PROCEDURE — 93306 TTE W/DOPPLER COMPLETE: CPT

## 2023-12-05 NOTE — TELEPHONE ENCOUNTER
LMOM to patient to notified him clearance was signed and faxed to [facility]. Clearance faxed with a successful conformation.

## 2023-12-08 ENCOUNTER — OFFICE VISIT (OUTPATIENT)
Dept: ENT CLINIC | Age: 88
End: 2023-12-08
Payer: MEDICARE

## 2023-12-08 VITALS
OXYGEN SATURATION: 96 % | TEMPERATURE: 97.6 F | BODY MASS INDEX: 24.29 KG/M2 | DIASTOLIC BLOOD PRESSURE: 80 MMHG | HEART RATE: 82 BPM | WEIGHT: 164 LBS | HEIGHT: 69 IN | SYSTOLIC BLOOD PRESSURE: 136 MMHG

## 2023-12-08 DIAGNOSIS — H81.4 VERTIGO OF CENTRAL ORIGIN: Primary | Chronic | ICD-10-CM

## 2023-12-08 DIAGNOSIS — H90.3 ASYMMETRIC SNHL (SENSORINEURAL HEARING LOSS): Chronic | ICD-10-CM

## 2023-12-08 PROCEDURE — 99214 OFFICE O/P EST MOD 30 MIN: CPT | Performed by: OTOLARYNGOLOGY

## 2023-12-08 PROCEDURE — 1123F ACP DISCUSS/DSCN MKR DOCD: CPT | Performed by: OTOLARYNGOLOGY

## 2023-12-08 RX ORDER — LATANOPROST 50 UG/ML
1 SOLUTION/ DROPS OPHTHALMIC EVERY MORNING
COMMUNITY

## 2023-12-08 NOTE — PROGRESS NOTES
The    CHIEF COMPLAINT  Chief Complaint   Patient presents with    Dizziness        HISTORY OF PRESENT ILLNESS    Javon Wong is a 82 y.o. male here for recheck and follow up of dizziness. Patient stated that dizziness is about the same. He is doing exercies per vestibular physical therapist.        REVIEW OF SYSTEMS  Constitutional:  Denied fever and chills. ENT/sinus:  Denied otalgia, otorrhea, nasal pain, rhinorrhea, sore throat, and sinus/facial pain. EXAMINATION    WDWN, NAD  Voice:  Normal.  Ears:  TMs, EACs, mastoids and pinnae were normal.    Nose:  Normal with no lesions. Sinuses:  NT x 4   Throat,  OC/OP:  Normal with no lesions. Neck:  NT, No masses. Trachea midline. Nodes:  No lymphadenopathy. REVIEW OF IMAGES:         I independently interpreted the images of the MRI scan of the IACs/brain/head, showing no evidence of vestibular schwannoma. MRI OF THE BRAIN AND INTERNAL AUDITORY CANALS WITH AND WITHOUT CONTRAST  11/1/2023 3:10 pm     TECHNIQUE:  Multiplanar multisequence MRI of the brain focused on the internal auditory  canals was performed with and without the administration of intravenous  contrast.     COMPARISON:  None     HISTORY:  ORDERING SYSTEM PROVIDED HISTORY: Dizziness  TECHNOLOGIST PROVIDED HISTORY:  STAT Creatinine as needed:->Yes  Reason for Exam: dizziness. asymmetric. 14mL multihance given. FINDINGS:  INTERNAL AUDITORY CANALS: No cerebellopontine angle mass is identified. The  intracanalicular segments of the 7th and 8th cranial nerves are normal.  The  cochlea, vestibule and semicircular canals are normal.  No abnormal  enhancement is present within the internal auditory canals. INTRACRANIAL STRUCTURES/VENTRICLES:  There are no areas of restricted  diffusion identified to suggest an acute infarct. There is no acute  intracranial hemorrhage. No mass effect or midline shift is present.  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

## (undated) DEVICE — PADDING CAST W4INXL4YD ST COT RAYON MICROPLEATED HIGHLY

## (undated) DEVICE — BANDAGE,ELASTIC,ESMARK,STERILE,6"X9',LF: Brand: MEDLINE

## (undated) DEVICE — T-DRAPE,EXTREMITY,STERILE: Brand: MEDLINE

## (undated) DEVICE — BIT DRL L100MM DIA2.5MM FOR SM FRAG UNIV LOK SYS

## (undated) DEVICE — GLOVE ORANGE PI 8   MSG9080

## (undated) DEVICE — GAUZE,SPONGE,4"X4",8PLY,STRL,LF,10/TRAY: Brand: MEDLINE

## (undated) DEVICE — ELECTRODE PT RET AD L9FT HI MOIST COND ADH HYDRGEL CORDED

## (undated) DEVICE — DRAPE C ARM UNIV W41XL74IN CLR PLAS XR VELC CLSR POLY STRP

## (undated) DEVICE — HYPODERMIC SAFETY NEEDLE: Brand: MAGELLAN

## (undated) DEVICE — SUTURE VCRL SZ 2-0 L18IN ABSRB UD CT-1 L36MM 1/2 CIR J839D

## (undated) DEVICE — Z DISCONTINUED USE 2275686 GLOVE SURG SZ 8 L12IN FNGR THK13MIL WHT ISOLEX POLYISOPRENE

## (undated) DEVICE — DRESSING,GAUZE,XEROFORM,CURAD,1"X8",ST: Brand: CURAD

## (undated) DEVICE — MEDICINE CUP, GRADUATED, STER: Brand: MEDLINE

## (undated) DEVICE — PACK PROCEDURE SURG EXTREMITY MFFOP CUST

## (undated) DEVICE — Z DISCONTINUED GLOVE SURG SZ 7 L12IN FNGR THK13MIL WHT ISOLEX POLYISOPRENE

## (undated) DEVICE — SPONGE LAP W18XL18IN WHT COT 4 PLY FLD STRUNG RADPQ DISP ST

## (undated) DEVICE — CHLORAPREP 26ML ORANGE

## (undated) DEVICE — 3M™ STERI-STRIP™ REINFORCED ADHESIVE SKIN CLOSURES, R1547, 1/2 IN X 4 IN (12 MM X 100 MM), 6 STRIPS/ENVELOPE: Brand: 3M™ STERI-STRIP™

## (undated) DEVICE — SUTURE VCRL SZ 3-0 L18IN ABSRB UD L26MM SH 1/2 CIR J864D

## (undated) DEVICE — TOWEL,OR,DSP,ST,BLUE,STD,4/PK,20PK/CS: Brand: MEDLINE

## (undated) DEVICE — SYRINGE IRRIG 60ML SFT PLIABLE BLB EZ TO GRP 1 HND USE W/

## (undated) DEVICE — BANDAGE COMPR W4INXL12FT E DISP ESMARCH EVEN

## (undated) DEVICE — INTENDED FOR TISSUE SEPARATION, AND OTHER PROCEDURES THAT REQUIRE A SHARP SURGICAL BLADE TO PUNCTURE OR CUT.: Brand: BARD-PARKER ® STAINLESS STEEL BLADES

## (undated) DEVICE — BIT DRL DIA3.5MM FOR SM FRAG UNIV LOK SYS

## (undated) DEVICE — SOLUTION IV IRRIG 500ML 0.9% SODIUM CHL 2F7123

## (undated) DEVICE — ZIMMER® STERILE DISPOSABLE TOURNIQUET CUFF WITH PLC, DUAL PORT, SINGLE BLADDER, 18 IN. (46 CM)

## (undated) DEVICE — MASTISOL ADHESIVE LIQ 2/3ML

## (undated) DEVICE — DRAPE,U/ SHT,SPLIT,PLAS,STERIL: Brand: MEDLINE

## (undated) DEVICE — ROOM TURNOVER KIT W/ ARM STRP

## (undated) DEVICE — BIT DRL DIA2MM STD QUIK CONN FOR PERIARTC LOK PLT SYS

## (undated) DEVICE — GLOVE SURG SZ 6 L12IN THK75MIL DK GRN LTX FREE

## (undated) DEVICE — SUTURE MCRYL SZ 4-0 L27IN ABSRB UD L19MM PS-2 1/2 CIR PRIM Y426H

## (undated) DEVICE — BANDAGE COMPR W6INXL11YD E SGL LAYERED VELC CLSR SHUR-BAND